# Patient Record
Sex: FEMALE | Race: WHITE | Employment: UNEMPLOYED | URBAN - METROPOLITAN AREA
[De-identification: names, ages, dates, MRNs, and addresses within clinical notes are randomized per-mention and may not be internally consistent; named-entity substitution may affect disease eponyms.]

---

## 2019-01-19 ENCOUNTER — APPOINTMENT (OUTPATIENT)
Dept: GENERAL RADIOLOGY | Age: 59
End: 2019-01-19
Payer: COMMERCIAL

## 2019-01-19 ENCOUNTER — HOSPITAL ENCOUNTER (EMERGENCY)
Age: 59
Discharge: HOME | End: 2019-01-19
Attending: EMERGENCY MEDICINE
Payer: COMMERCIAL

## 2019-01-19 VITALS
OXYGEN SATURATION: 100 % | HEART RATE: 94 BPM | SYSTOLIC BLOOD PRESSURE: 150 MMHG | BODY MASS INDEX: 15.7 KG/M2 | HEIGHT: 67 IN | RESPIRATION RATE: 21 BRPM | WEIGHT: 100 LBS | TEMPERATURE: 98 F | DIASTOLIC BLOOD PRESSURE: 98 MMHG

## 2019-01-19 DIAGNOSIS — J40 BRONCHITIS: ICD-10-CM

## 2019-01-19 DIAGNOSIS — J44.1 COPD EXACERBATION (HCC): Primary | ICD-10-CM

## 2019-01-19 LAB
A/G RATIO: 1.3 (ref 1.1–2.2)
ALBUMIN SERPL-MCNC: 4.2 G/DL (ref 3.4–5)
ALP BLD-CCNC: 69 U/L (ref 40–129)
ALT SERPL-CCNC: 11 U/L (ref 10–40)
ANION GAP SERPL CALCULATED.3IONS-SCNC: 11 MMOL/L (ref 3–16)
AST SERPL-CCNC: 19 U/L (ref 15–37)
BASOPHILS ABSOLUTE: 0.1 K/UL (ref 0–0.2)
BASOPHILS RELATIVE PERCENT: 0.8 %
BILIRUB SERPL-MCNC: 0.4 MG/DL (ref 0–1)
BUN BLDV-MCNC: 15 MG/DL (ref 7–20)
CALCIUM SERPL-MCNC: 9.6 MG/DL (ref 8.3–10.6)
CHLORIDE BLD-SCNC: 104 MMOL/L (ref 99–110)
CO2: 27 MMOL/L (ref 21–32)
CREAT SERPL-MCNC: <0.5 MG/DL (ref 0.6–1.1)
EOSINOPHILS ABSOLUTE: 0.1 K/UL (ref 0–0.6)
EOSINOPHILS RELATIVE PERCENT: 0.9 %
GFR AFRICAN AMERICAN: >60
GFR NON-AFRICAN AMERICAN: >60
GLOBULIN: 3.2 G/DL
GLUCOSE BLD-MCNC: 98 MG/DL (ref 70–99)
HCT VFR BLD CALC: 45.2 % (ref 36–48)
HEMOGLOBIN: 15.3 G/DL (ref 12–16)
LYMPHOCYTES ABSOLUTE: 1.6 K/UL (ref 1–5.1)
LYMPHOCYTES RELATIVE PERCENT: 17.4 %
MCH RBC QN AUTO: 33 PG (ref 26–34)
MCHC RBC AUTO-ENTMCNC: 33.9 G/DL (ref 31–36)
MCV RBC AUTO: 97.4 FL (ref 80–100)
MONOCYTES ABSOLUTE: 0.6 K/UL (ref 0–1.3)
MONOCYTES RELATIVE PERCENT: 6.7 %
NEUTROPHILS ABSOLUTE: 6.8 K/UL (ref 1.7–7.7)
NEUTROPHILS RELATIVE PERCENT: 74.2 %
PDW BLD-RTO: 13.3 % (ref 12.4–15.4)
PLATELET # BLD: 274 K/UL (ref 135–450)
PMV BLD AUTO: 7.4 FL (ref 5–10.5)
POTASSIUM SERPL-SCNC: 4.1 MMOL/L (ref 3.5–5.1)
RBC # BLD: 4.64 M/UL (ref 4–5.2)
SODIUM BLD-SCNC: 142 MMOL/L (ref 136–145)
TOTAL PROTEIN: 7.4 G/DL (ref 6.4–8.2)
WBC # BLD: 9.2 K/UL (ref 4–11)

## 2019-01-19 PROCEDURE — 71046 X-RAY EXAM CHEST 2 VIEWS: CPT

## 2019-01-19 PROCEDURE — 99285 EMERGENCY DEPT VISIT HI MDM: CPT

## 2019-01-19 PROCEDURE — 80053 COMPREHEN METABOLIC PANEL: CPT

## 2019-01-19 PROCEDURE — 85025 COMPLETE CBC W/AUTO DIFF WBC: CPT

## 2019-01-19 PROCEDURE — 93005 ELECTROCARDIOGRAM TRACING: CPT | Performed by: EMERGENCY MEDICINE

## 2019-01-19 PROCEDURE — 6360000002 HC RX W HCPCS: Performed by: EMERGENCY MEDICINE

## 2019-01-19 PROCEDURE — 36415 COLL VENOUS BLD VENIPUNCTURE: CPT

## 2019-01-19 PROCEDURE — 6370000000 HC RX 637 (ALT 250 FOR IP): Performed by: EMERGENCY MEDICINE

## 2019-01-19 RX ORDER — NICOTINE 21 MG/24HR
1 PATCH, TRANSDERMAL 24 HOURS TRANSDERMAL EVERY 24 HOURS
COMMUNITY
End: 2019-01-19

## 2019-01-19 RX ORDER — BISMUTH SUBSALICYLATE 262 MG/1
524 TABLET, CHEWABLE ORAL
Qty: 60 TABLET | Refills: 0 | Status: SHIPPED | OUTPATIENT
Start: 2019-01-19 | End: 2020-07-08

## 2019-01-19 RX ORDER — IPRATROPIUM BROMIDE AND ALBUTEROL SULFATE 2.5; .5 MG/3ML; MG/3ML
1 SOLUTION RESPIRATORY (INHALATION) ONCE
Status: COMPLETED | OUTPATIENT
Start: 2019-01-19 | End: 2019-01-19

## 2019-01-19 RX ORDER — BISMUTH SUBSALICYLATE 262 MG/1
524 TABLET, CHEWABLE ORAL
COMMUNITY
End: 2019-01-19

## 2019-01-19 RX ORDER — PAROXETINE HYDROCHLORIDE 20 MG/1
20 TABLET, FILM COATED ORAL EVERY MORNING
COMMUNITY
End: 2019-01-19

## 2019-01-19 RX ORDER — CHOLESTYRAMINE 4 G/9G
1 POWDER, FOR SUSPENSION ORAL 2 TIMES DAILY
Qty: 90 PACKET | Refills: 0 | Status: SHIPPED | OUTPATIENT
Start: 2019-01-19 | End: 2019-05-07 | Stop reason: CLARIF

## 2019-01-19 RX ORDER — PREDNISONE 1 MG/1
40 TABLET ORAL DAILY
Qty: 200 TABLET | Refills: 0 | Status: SHIPPED | OUTPATIENT
Start: 2019-01-19 | End: 2019-01-24

## 2019-01-19 RX ORDER — NICOTINE 21 MG/24HR
1 PATCH, TRANSDERMAL 24 HOURS TRANSDERMAL EVERY 24 HOURS
Qty: 30 PATCH | Refills: 0 | Status: SHIPPED | OUTPATIENT
Start: 2019-01-19 | End: 2019-10-17 | Stop reason: ALTCHOICE

## 2019-01-19 RX ORDER — ALBUTEROL SULFATE 2.5 MG/3ML
5 SOLUTION RESPIRATORY (INHALATION) ONCE
Status: COMPLETED | OUTPATIENT
Start: 2019-01-19 | End: 2019-01-19

## 2019-01-19 RX ORDER — POTASSIUM CHLORIDE 1.5 G/1.77G
20 POWDER, FOR SOLUTION ORAL 2 TIMES DAILY
Qty: 30 EACH | Refills: 3 | Status: SHIPPED | OUTPATIENT
Start: 2019-01-19 | End: 2019-10-17 | Stop reason: ALTCHOICE

## 2019-01-19 RX ORDER — LEVOTHYROXINE SODIUM 88 UG/1
88 TABLET ORAL DAILY
COMMUNITY
End: 2019-01-19

## 2019-01-19 RX ORDER — PAROXETINE HYDROCHLORIDE 20 MG/1
20 TABLET, FILM COATED ORAL EVERY MORNING
Qty: 30 TABLET | Refills: 0 | Status: SHIPPED | OUTPATIENT
Start: 2019-01-19 | End: 2019-10-17 | Stop reason: ALTCHOICE

## 2019-01-19 RX ORDER — LEVOTHYROXINE SODIUM 88 UG/1
88 TABLET ORAL DAILY
Qty: 30 TABLET | Refills: 0 | Status: SHIPPED | OUTPATIENT
Start: 2019-01-19 | End: 2020-11-10

## 2019-01-19 RX ORDER — GABAPENTIN 100 MG/1
100 CAPSULE ORAL 3 TIMES DAILY
COMMUNITY
End: 2019-01-19

## 2019-01-19 RX ORDER — ALBUTEROL SULFATE 90 UG/1
2 AEROSOL, METERED RESPIRATORY (INHALATION) ONCE
Status: COMPLETED | OUTPATIENT
Start: 2019-01-19 | End: 2019-01-19

## 2019-01-19 RX ORDER — CHOLESTYRAMINE 4 G/9G
1 POWDER, FOR SUSPENSION ORAL 2 TIMES DAILY
COMMUNITY
End: 2019-01-19

## 2019-01-19 RX ORDER — PREDNISONE 20 MG/1
40 TABLET ORAL ONCE
Status: COMPLETED | OUTPATIENT
Start: 2019-01-19 | End: 2019-01-19

## 2019-01-19 RX ORDER — GABAPENTIN 100 MG/1
100 CAPSULE ORAL 3 TIMES DAILY
Qty: 90 CAPSULE | Refills: 0 | Status: SHIPPED | OUTPATIENT
Start: 2019-01-19 | End: 2019-10-17 | Stop reason: ALTCHOICE

## 2019-01-19 RX ADMIN — ALBUTEROL SULFATE 5 MG: 2.5 SOLUTION RESPIRATORY (INHALATION) at 19:52

## 2019-01-19 RX ADMIN — PREDNISONE 40 MG: 20 TABLET ORAL at 19:52

## 2019-01-19 RX ADMIN — IPRATROPIUM BROMIDE AND ALBUTEROL SULFATE 1 AMPULE: .5; 3 SOLUTION RESPIRATORY (INHALATION) at 19:52

## 2019-01-19 RX ADMIN — ALBUTEROL SULFATE 2 PUFF: 90 AEROSOL, METERED RESPIRATORY (INHALATION) at 21:19

## 2019-01-20 LAB
EKG ATRIAL RATE: 93 BPM
EKG DIAGNOSIS: NORMAL
EKG P AXIS: 82 DEGREES
EKG P-R INTERVAL: 160 MS
EKG Q-T INTERVAL: 370 MS
EKG QRS DURATION: 72 MS
EKG QTC CALCULATION (BAZETT): 460 MS
EKG R AXIS: 85 DEGREES
EKG T AXIS: 78 DEGREES
EKG VENTRICULAR RATE: 93 BPM

## 2019-01-20 PROCEDURE — 93010 ELECTROCARDIOGRAM REPORT: CPT | Performed by: INTERNAL MEDICINE

## 2019-01-29 ENCOUNTER — APPOINTMENT (OUTPATIENT)
Dept: GENERAL RADIOLOGY | Age: 59
End: 2019-01-29
Payer: COMMERCIAL

## 2019-01-29 ENCOUNTER — APPOINTMENT (OUTPATIENT)
Dept: CT IMAGING | Age: 59
End: 2019-01-29
Payer: COMMERCIAL

## 2019-01-29 ENCOUNTER — HOSPITAL ENCOUNTER (EMERGENCY)
Age: 59
Discharge: HOME | End: 2019-01-29
Attending: EMERGENCY MEDICINE
Payer: COMMERCIAL

## 2019-01-29 VITALS
TEMPERATURE: 97.5 F | BODY MASS INDEX: 15.7 KG/M2 | WEIGHT: 100 LBS | RESPIRATION RATE: 16 BRPM | OXYGEN SATURATION: 98 % | HEIGHT: 67 IN | SYSTOLIC BLOOD PRESSURE: 102 MMHG | HEART RATE: 102 BPM | DIASTOLIC BLOOD PRESSURE: 75 MMHG

## 2019-01-29 DIAGNOSIS — I31.39 PERICARDIAL EFFUSION: ICD-10-CM

## 2019-01-29 DIAGNOSIS — J18.9 PNEUMONIA, UNSPECIFIED ORGANISM: Primary | ICD-10-CM

## 2019-01-29 DIAGNOSIS — R07.9 LEFT SIDED CHEST PAIN: ICD-10-CM

## 2019-01-29 DIAGNOSIS — J44.1 COPD EXACERBATION (HCC): ICD-10-CM

## 2019-01-29 DIAGNOSIS — R91.1 PULMONARY NODULE: ICD-10-CM

## 2019-01-29 LAB
A/G RATIO: 1.1 (ref 1.1–2.2)
ALBUMIN SERPL-MCNC: 3.4 G/DL (ref 3.4–5)
ALP BLD-CCNC: 59 U/L (ref 40–129)
ALT SERPL-CCNC: 11 U/L (ref 10–40)
ANION GAP SERPL CALCULATED.3IONS-SCNC: 10 MMOL/L (ref 3–16)
AST SERPL-CCNC: 13 U/L (ref 15–37)
BASOPHILS ABSOLUTE: 0.1 K/UL (ref 0–0.2)
BASOPHILS RELATIVE PERCENT: 1.1 %
BILIRUB SERPL-MCNC: 0.3 MG/DL (ref 0–1)
BUN BLDV-MCNC: 21 MG/DL (ref 7–20)
CALCIUM SERPL-MCNC: 9.2 MG/DL (ref 8.3–10.6)
CHLORIDE BLD-SCNC: 100 MMOL/L (ref 99–110)
CO2: 30 MMOL/L (ref 21–32)
CREAT SERPL-MCNC: 0.7 MG/DL (ref 0.6–1.1)
D DIMER: 320 NG/ML DDU (ref 0–229)
EKG ATRIAL RATE: 92 BPM
EKG DIAGNOSIS: NORMAL
EKG P AXIS: 78 DEGREES
EKG P-R INTERVAL: 130 MS
EKG Q-T INTERVAL: 360 MS
EKG QRS DURATION: 70 MS
EKG QTC CALCULATION (BAZETT): 445 MS
EKG R AXIS: 82 DEGREES
EKG T AXIS: 77 DEGREES
EKG VENTRICULAR RATE: 92 BPM
EOSINOPHILS ABSOLUTE: 0.1 K/UL (ref 0–0.6)
EOSINOPHILS RELATIVE PERCENT: 1.3 %
GFR AFRICAN AMERICAN: >60
GFR NON-AFRICAN AMERICAN: >60
GLOBULIN: 3.2 G/DL
GLUCOSE BLD-MCNC: 101 MG/DL (ref 70–99)
HCT VFR BLD CALC: 40.2 % (ref 36–48)
HEMOGLOBIN: 13.6 G/DL (ref 12–16)
LACTIC ACID, SEPSIS: 1.8 MMOL/L (ref 0.4–1.9)
LACTIC ACID: 2.1 MMOL/L (ref 0.4–2)
LYMPHOCYTES ABSOLUTE: 2.1 K/UL (ref 1–5.1)
LYMPHOCYTES RELATIVE PERCENT: 20.5 %
MAGNESIUM: 2.1 MG/DL (ref 1.8–2.4)
MCH RBC QN AUTO: 32.9 PG (ref 26–34)
MCHC RBC AUTO-ENTMCNC: 33.8 G/DL (ref 31–36)
MCV RBC AUTO: 97.4 FL (ref 80–100)
MONOCYTES ABSOLUTE: 0.8 K/UL (ref 0–1.3)
MONOCYTES RELATIVE PERCENT: 7.8 %
NEUTROPHILS ABSOLUTE: 7.2 K/UL (ref 1.7–7.7)
NEUTROPHILS RELATIVE PERCENT: 69.3 %
PDW BLD-RTO: 13.2 % (ref 12.4–15.4)
PLATELET # BLD: 340 K/UL (ref 135–450)
PMV BLD AUTO: 6.9 FL (ref 5–10.5)
POTASSIUM REFLEX MAGNESIUM: 3.3 MMOL/L (ref 3.5–5.1)
RAPID INFLUENZA  B AGN: NEGATIVE
RAPID INFLUENZA A AGN: NEGATIVE
RBC # BLD: 4.12 M/UL (ref 4–5.2)
SODIUM BLD-SCNC: 140 MMOL/L (ref 136–145)
TOTAL PROTEIN: 6.6 G/DL (ref 6.4–8.2)
TROPONIN: <0.01 NG/ML
WBC # BLD: 10.4 K/UL (ref 4–11)

## 2019-01-29 PROCEDURE — 2500000003 HC RX 250 WO HCPCS: Performed by: PHYSICIAN ASSISTANT

## 2019-01-29 PROCEDURE — 93005 ELECTROCARDIOGRAM TRACING: CPT | Performed by: PHYSICIAN ASSISTANT

## 2019-01-29 PROCEDURE — 93010 ELECTROCARDIOGRAM REPORT: CPT | Performed by: INTERNAL MEDICINE

## 2019-01-29 PROCEDURE — 87040 BLOOD CULTURE FOR BACTERIA: CPT

## 2019-01-29 PROCEDURE — 6360000002 HC RX W HCPCS: Performed by: PHYSICIAN ASSISTANT

## 2019-01-29 PROCEDURE — 84484 ASSAY OF TROPONIN QUANT: CPT

## 2019-01-29 PROCEDURE — 87804 INFLUENZA ASSAY W/OPTIC: CPT

## 2019-01-29 PROCEDURE — 99285 EMERGENCY DEPT VISIT HI MDM: CPT

## 2019-01-29 PROCEDURE — 71260 CT THORAX DX C+: CPT

## 2019-01-29 PROCEDURE — 36415 COLL VENOUS BLD VENIPUNCTURE: CPT

## 2019-01-29 PROCEDURE — 96375 TX/PRO/DX INJ NEW DRUG ADDON: CPT

## 2019-01-29 PROCEDURE — 83605 ASSAY OF LACTIC ACID: CPT

## 2019-01-29 PROCEDURE — 80053 COMPREHEN METABOLIC PANEL: CPT

## 2019-01-29 PROCEDURE — 96361 HYDRATE IV INFUSION ADD-ON: CPT

## 2019-01-29 PROCEDURE — 6370000000 HC RX 637 (ALT 250 FOR IP): Performed by: EMERGENCY MEDICINE

## 2019-01-29 PROCEDURE — 85025 COMPLETE CBC W/AUTO DIFF WBC: CPT

## 2019-01-29 PROCEDURE — 96365 THER/PROPH/DIAG IV INF INIT: CPT

## 2019-01-29 PROCEDURE — 6360000002 HC RX W HCPCS: Performed by: EMERGENCY MEDICINE

## 2019-01-29 PROCEDURE — 83735 ASSAY OF MAGNESIUM: CPT

## 2019-01-29 PROCEDURE — 6370000000 HC RX 637 (ALT 250 FOR IP): Performed by: PHYSICIAN ASSISTANT

## 2019-01-29 PROCEDURE — 6360000004 HC RX CONTRAST MEDICATION: Performed by: PHYSICIAN ASSISTANT

## 2019-01-29 PROCEDURE — 85379 FIBRIN DEGRADATION QUANT: CPT

## 2019-01-29 PROCEDURE — 2580000003 HC RX 258: Performed by: PHYSICIAN ASSISTANT

## 2019-01-29 PROCEDURE — 71045 X-RAY EXAM CHEST 1 VIEW: CPT

## 2019-01-29 RX ORDER — METHYLPREDNISOLONE SODIUM SUCCINATE 125 MG/2ML
125 INJECTION, POWDER, LYOPHILIZED, FOR SOLUTION INTRAMUSCULAR; INTRAVENOUS ONCE
Status: COMPLETED | OUTPATIENT
Start: 2019-01-29 | End: 2019-01-29

## 2019-01-29 RX ORDER — ALBUTEROL SULFATE 2.5 MG/3ML
2.5 SOLUTION RESPIRATORY (INHALATION) EVERY 6 HOURS PRN
Qty: 60 VIAL | Refills: 0 | Status: SHIPPED | OUTPATIENT
Start: 2019-01-29 | End: 2022-07-21 | Stop reason: SDUPTHER

## 2019-01-29 RX ORDER — KETOROLAC TROMETHAMINE 30 MG/ML
15 INJECTION, SOLUTION INTRAMUSCULAR; INTRAVENOUS ONCE
Status: COMPLETED | OUTPATIENT
Start: 2019-01-29 | End: 2019-01-29

## 2019-01-29 RX ORDER — ALBUTEROL SULFATE 2.5 MG/3ML
2.5 SOLUTION RESPIRATORY (INHALATION) ONCE
Status: COMPLETED | OUTPATIENT
Start: 2019-01-29 | End: 2019-01-29

## 2019-01-29 RX ORDER — DIPHENHYDRAMINE HYDROCHLORIDE 50 MG/ML
50 INJECTION INTRAMUSCULAR; INTRAVENOUS ONCE
Status: COMPLETED | OUTPATIENT
Start: 2019-01-29 | End: 2019-01-29

## 2019-01-29 RX ORDER — LEVOFLOXACIN 750 MG/1
750 TABLET ORAL DAILY
Qty: 5 TABLET | Refills: 0 | Status: SHIPPED | OUTPATIENT
Start: 2019-01-29 | End: 2019-02-03

## 2019-01-29 RX ORDER — 0.9 % SODIUM CHLORIDE 0.9 %
1000 INTRAVENOUS SOLUTION INTRAVENOUS ONCE
Status: COMPLETED | OUTPATIENT
Start: 2019-01-29 | End: 2019-01-29

## 2019-01-29 RX ORDER — LEVOFLOXACIN 5 MG/ML
500 INJECTION, SOLUTION INTRAVENOUS ONCE
Status: COMPLETED | OUTPATIENT
Start: 2019-01-29 | End: 2019-01-29

## 2019-01-29 RX ORDER — IPRATROPIUM BROMIDE AND ALBUTEROL SULFATE 2.5; .5 MG/3ML; MG/3ML
1 SOLUTION RESPIRATORY (INHALATION) ONCE
Status: COMPLETED | OUTPATIENT
Start: 2019-01-29 | End: 2019-01-29

## 2019-01-29 RX ORDER — PREDNISONE 10 MG/1
TABLET ORAL
Qty: 30 TABLET | Refills: 0 | Status: SHIPPED | OUTPATIENT
Start: 2019-01-29 | End: 2019-02-08

## 2019-01-29 RX ORDER — LIDOCAINE 4 G/G
1 PATCH TOPICAL DAILY
Status: DISCONTINUED | OUTPATIENT
Start: 2019-01-29 | End: 2019-01-29 | Stop reason: HOSPADM

## 2019-01-29 RX ORDER — LIDOCAINE 50 MG/G
1 PATCH TOPICAL EVERY 24 HOURS
Qty: 10 PATCH | Refills: 0 | Status: SHIPPED | OUTPATIENT
Start: 2019-01-29 | End: 2019-02-08

## 2019-01-29 RX ORDER — 0.9 % SODIUM CHLORIDE 0.9 %
400 INTRAVENOUS SOLUTION INTRAVENOUS ONCE
Status: COMPLETED | OUTPATIENT
Start: 2019-01-29 | End: 2019-01-29

## 2019-01-29 RX ORDER — POTASSIUM CHLORIDE 20MEQ/15ML
20 LIQUID (ML) ORAL ONCE
Status: COMPLETED | OUTPATIENT
Start: 2019-01-29 | End: 2019-01-29

## 2019-01-29 RX ADMIN — IPRATROPIUM BROMIDE AND ALBUTEROL SULFATE 1 AMPULE: .5; 3 SOLUTION RESPIRATORY (INHALATION) at 13:24

## 2019-01-29 RX ADMIN — DIPHENHYDRAMINE HYDROCHLORIDE 50 MG: 50 INJECTION, SOLUTION INTRAMUSCULAR; INTRAVENOUS at 15:20

## 2019-01-29 RX ADMIN — ALBUTEROL SULFATE 2.5 MG: 2.5 SOLUTION RESPIRATORY (INHALATION) at 16:36

## 2019-01-29 RX ADMIN — SODIUM CHLORIDE 400 ML: 9 INJECTION, SOLUTION INTRAVENOUS at 15:20

## 2019-01-29 RX ADMIN — SODIUM CHLORIDE 1000 ML: 9 INJECTION, SOLUTION INTRAVENOUS at 13:24

## 2019-01-29 RX ADMIN — IOPAMIDOL 75 ML: 755 INJECTION, SOLUTION INTRAVENOUS at 15:49

## 2019-01-29 RX ADMIN — LEVOFLOXACIN 500 MG: 5 INJECTION, SOLUTION INTRAVENOUS at 14:34

## 2019-01-29 RX ADMIN — KETOROLAC TROMETHAMINE 15 MG: 30 INJECTION, SOLUTION INTRAMUSCULAR; INTRAVENOUS at 14:35

## 2019-01-29 RX ADMIN — POTASSIUM CHLORIDE 20 MEQ: 20 SOLUTION ORAL at 15:20

## 2019-01-29 RX ADMIN — IPRATROPIUM BROMIDE AND ALBUTEROL SULFATE 1 AMPULE: .5; 3 SOLUTION RESPIRATORY (INHALATION) at 16:05

## 2019-01-29 RX ADMIN — FAMOTIDINE 20 MG: 10 INJECTION, SOLUTION INTRAVENOUS at 15:20

## 2019-01-29 RX ADMIN — METHYLPREDNISOLONE SODIUM SUCCINATE 125 MG: 125 INJECTION, POWDER, FOR SOLUTION INTRAMUSCULAR; INTRAVENOUS at 13:23

## 2019-01-29 ASSESSMENT — PAIN DESCRIPTION - ORIENTATION: ORIENTATION: LEFT

## 2019-01-29 ASSESSMENT — ENCOUNTER SYMPTOMS
COUGH: 1
SHORTNESS OF BREATH: 1
ABDOMINAL PAIN: 0
VOMITING: 0
SPUTUM PRODUCTION: 1

## 2019-01-29 ASSESSMENT — PAIN DESCRIPTION - DESCRIPTORS
DESCRIPTORS: HEADACHE;SHARP
DESCRIPTORS: ACHING

## 2019-01-29 ASSESSMENT — PAIN DESCRIPTION - PAIN TYPE: TYPE: ACUTE PAIN

## 2019-01-29 ASSESSMENT — PAIN DESCRIPTION - LOCATION
LOCATION: RIB CAGE
LOCATION: CHEST;HEAD

## 2019-01-29 ASSESSMENT — PAIN DESCRIPTION - FREQUENCY: FREQUENCY: CONTINUOUS

## 2019-01-29 ASSESSMENT — PAIN SCALES - GENERAL
PAINLEVEL_OUTOF10: 7
PAINLEVEL_OUTOF10: 10

## 2019-01-29 ASSESSMENT — HEART SCORE: ECG: 0

## 2019-02-04 LAB
BLOOD CULTURE, ROUTINE: NORMAL
CULTURE, BLOOD 2: NORMAL

## 2019-04-23 ENCOUNTER — TELEPHONE (OUTPATIENT)
Dept: PULMONOLOGY | Age: 59
End: 2019-04-23

## 2019-04-23 DIAGNOSIS — R91.1 PULMONARY NODULE: Primary | ICD-10-CM

## 2019-05-03 ENCOUNTER — HOSPITAL ENCOUNTER (OUTPATIENT)
Dept: CT IMAGING | Age: 59
Discharge: HOME | End: 2019-05-03
Payer: COMMERCIAL

## 2019-05-03 DIAGNOSIS — R91.1 PULMONARY NODULE: ICD-10-CM

## 2019-05-03 PROCEDURE — 71250 CT THORAX DX C-: CPT

## 2019-05-07 ENCOUNTER — OFFICE VISIT (OUTPATIENT)
Dept: PULMONOLOGY | Age: 59
End: 2019-05-07
Payer: COMMERCIAL

## 2019-05-07 VITALS
HEART RATE: 72 BPM | BODY MASS INDEX: 15.54 KG/M2 | RESPIRATION RATE: 16 BRPM | WEIGHT: 99 LBS | OXYGEN SATURATION: 99 % | HEIGHT: 67 IN | DIASTOLIC BLOOD PRESSURE: 64 MMHG | TEMPERATURE: 98.7 F | SYSTOLIC BLOOD PRESSURE: 90 MMHG

## 2019-05-07 DIAGNOSIS — R91.1 PULMONARY NODULE: ICD-10-CM

## 2019-05-07 DIAGNOSIS — Z72.0 TOBACCO ABUSE: ICD-10-CM

## 2019-05-07 DIAGNOSIS — J44.9 CHRONIC OBSTRUCTIVE PULMONARY DISEASE, UNSPECIFIED COPD TYPE (HCC): Primary | ICD-10-CM

## 2019-05-07 PROCEDURE — 99245 OFF/OP CONSLTJ NEW/EST HI 55: CPT | Performed by: INTERNAL MEDICINE

## 2019-05-07 PROCEDURE — 3023F SPIROM DOC REV: CPT | Performed by: INTERNAL MEDICINE

## 2019-05-07 PROCEDURE — G8926 SPIRO NO PERF OR DOC: HCPCS | Performed by: INTERNAL MEDICINE

## 2019-05-07 PROCEDURE — G8419 CALC BMI OUT NRM PARAM NOF/U: HCPCS | Performed by: INTERNAL MEDICINE

## 2019-05-07 PROCEDURE — G8427 DOCREV CUR MEDS BY ELIG CLIN: HCPCS | Performed by: INTERNAL MEDICINE

## 2019-05-07 RX ORDER — POLYETHYLENE GLYCOL 3350 17 G
2 POWDER IN PACKET (EA) ORAL PRN
COMMUNITY
End: 2019-10-17 | Stop reason: ALTCHOICE

## 2019-05-07 RX ORDER — DICYCLOMINE HYDROCHLORIDE 10 MG/1
10 CAPSULE ORAL 3 TIMES DAILY
COMMUNITY
End: 2019-10-17 | Stop reason: ALTCHOICE

## 2019-05-07 RX ORDER — ALBUTEROL SULFATE 90 UG/1
2 AEROSOL, METERED RESPIRATORY (INHALATION) EVERY 6 HOURS PRN
COMMUNITY
End: 2020-07-08 | Stop reason: SDUPTHER

## 2019-05-07 RX ORDER — TRAMADOL HYDROCHLORIDE 50 MG/1
50 TABLET ORAL EVERY 8 HOURS PRN
COMMUNITY
End: 2019-10-17 | Stop reason: ALTCHOICE

## 2019-05-07 RX ORDER — MELOXICAM 15 MG/1
7.5 TABLET ORAL 2 TIMES DAILY
COMMUNITY
End: 2020-11-10

## 2019-05-07 RX ORDER — CELECOXIB 200 MG/1
200 CAPSULE ORAL 2 TIMES DAILY
COMMUNITY
End: 2019-10-17 | Stop reason: ALTCHOICE

## 2019-05-07 NOTE — PROGRESS NOTES
Chief Complaint: lung nodule    Consulting provider: JASON Casper -*    HPI: 62 y.o. female patient is being seen at the request of JASON Casper for a consultation regarding an abnormal Chest CT. She presented to the emergency room in January and was treated for pneumonia which was found on her CAT scan. The pneumonia had some nodular components and the patient was referred for follow-up. The patient has a history of smoking and COPD. The patient does not have SOB at rest but has ACKERMAN. Exercise tolerance on level ground is not limited. The patient has a daily intermittent cough that is usually dry or sometimes productive of yellow sputum. The patient does wheeze intermittently. No fever. The patient has smoked 1- 1.5 PPD for 40 years. Environmental/chemical exposure: Asbestos exposure: no  Patient  isn on disability  TB exposure: no    The information above included the review and summarization of old records. The history was obtained from these old records and from the patient directly. Outside information from Vish Molina NP regarding an abnormal Chest CT was  was reviewed. Moved here for [place] where she had PFts in the past    REVIEW OF SYSTEMS:    See HPI and scanned Review of Systems sheet. Past Medical History:   Diagnosis Date    COPD (chronic obstructive pulmonary disease) ([facility] [address]     Hypertension      Past Surgical History      Procedure Laterality Date    BREAST LUMPECTOMY  2015     SECTION      THYROIDECTOMY       Social History:    TOBACCO:   reports that she has been smoking cigarettes. She has been smoking about 0.50 packs per day. She has never used smokeless tobacco.  ETOH:   reports that she does not drink alcohol. Family History  History reviewed. No pertinent family history. Allergies:  is allergic to contrast [iodides] and vicodin [hydrocodone-acetaminophen].       Current Outpatient Medications:     celecoxib (CELEBREX) 200 MG capsule, Take 200 mg by mouth 2 times daily, Disp: , Rfl:     traMADol (ULTRAM) 50 MG tablet, Take 50 mg by mouth every 8 hours as needed for Pain., Disp: , Rfl:     dicyclomine (BENTYL) 10 MG capsule, Take 10 mg by mouth 3 times daily, Disp: , Rfl:     meloxicam (MOBIC) 15 MG tablet, Take 15 mg by mouth daily, Disp: , Rfl:     albuterol sulfate  (90 Base) MCG/ACT inhaler, Inhale 2 puffs into the lungs every 6 hours as needed for Wheezing, Disp: , Rfl:     nicotine polacrilex (COMMIT) 2 MG lozenge, Take 2 mg by mouth as needed for Smoking cessation, Disp: , Rfl:     umeclidinium-vilanterol (ANORO ELLIPTA) 62.5-25 MCG/INH AEPB inhaler, Inhale 1 puff into the lungs daily, Disp: 1 each, Rfl: 11    Cholecalciferol (VITAMIN D3) 5000 units CAPS, , Disp: , Rfl:     albuterol (PROVENTIL) (2.5 MG/3ML) 0.083% nebulizer solution, Take 3 mLs by nebulization every 6 hours as needed for Wheezing, Disp: 60 vial, Rfl: 0    alendronate-cholecalciferol (FOSAMAX PLUS D)  MG-UNIT per tablet, Take 1 tablet by mouth every 7 days, Disp: 4 tablet, Rfl: 0    bismuth subsalicylate (PEPTO BISMOL) 262 MG chewable tablet, Take 2 tablets by mouth 4 times daily (before meals and nightly), Disp: 60 tablet, Rfl: 0    gabapentin (NEURONTIN) 100 MG capsule, Take 1 capsule by mouth 3 times daily for 30 days. ., Disp: 90 capsule, Rfl: 0    levothyroxine (SYNTHROID) 88 MCG tablet, Take 1 tablet by mouth Daily, Disp: 30 tablet, Rfl: 0    nicotine (NICODERM CQ) 14 MG/24HR, Place 1 patch onto the skin every 24 hours, Disp: 30 patch, Rfl: 0    PARoxetine (PAXIL) 20 MG tablet, Take 1 tablet by mouth every morning (Patient taking differently: Take 40 mg by mouth every morning ), Disp: 30 tablet, Rfl: 0    potassium chloride (KLOR-CON) 20 MEQ packet, Take 20 mEq by mouth 2 times daily, Disp: 30 each, Rfl: 3    Umeclidinium Bromide 62.5 MCG/INH AEPB, Inhale 1 puff into the lungs daily, Disp: 30 each, Rfl: 0    Objective:   PHYSICAL EXAM:   BP 90/64 (Site: Left Upper Arm, Position: Sitting, Cuff Size: Medium Adult)   Pulse 72   Temp 98.7 °F (37.1 °C) (Oral)   Resp 16   Ht 5' 7\" (1.702 m)   Wt 99 lb (44.9 kg)   SpO2 99% Comment: RA  BMI 15.51 kg/m²    Constitutional:  No acute distress. Eyes: PERRL. Conjunctivae anicteric. ENT: Normal nose. Normal tongue. Oropharynx is clear. Neck:  Trachea is midline. No thyroid tenderness. Respiratory: No accessory muscle usage. Decreased breath sounds. No wheezes. No rales. No Rhonchi. Cardiovascular: Normal S1S2. No digit clubbing. No digit cyanosis. No LE edema. Lymphatic: No cervical or supraclavicular adenopathy. Skin: No rash on the exposed extremities. No Nodules or induration on exposed extremities. Psychiatric: No anxiety or Agitation. Alert and Oriented to person, place and time. LABS:  The recent relevant labs were reviewed    PFTs Date  FVC  (%) FEV1  (%) FEV1/FVC ratio    TLC  (%)  RV  (%)   DLCO (%) Bronchodilator response:    IMAGING:  I personally reviewed and interpreted the following imaging today in the office:   5/3/19 Chest CT:  1. Near complete resolution of airspace changes in the lingula. 2. Resolution of airspace changes in the right middle and lower lobes as well   with no residual nodules.  Calcified granulomata are stable. ASSESSMENT:  · Pneumonia is resolved  · COPD  · Tobacco abuse    PLAN:   · She is trying to quit smoking using a nicotine patch  · PFTs next visit  · Obtain past PFTs from [facility]  · She is on anoro and PRN albuterol  · Rx for Anoro  Screening CT scan was considered in a lung cancer screening counseling and shared decision making visit today that included the following elements:   Eligibility: Age: 62. There are no signs or symptoms of lung cancer.   Tobacco History 40-60 pack-years  Verbal counseling has been performed by me to include benefits and harms of screening, follow-up diagnostic testing, over-diagnosis, false positive rate, and total radiation exposure;   I have counseled on the importance of adherence to annual lung cancer LDCT screening, the impact of comorbidities and patient is willing to undergo diagnosis and treatment;   I have provided counseling on the importance of maintaining cigarette smoking abstinence if former smoker; or the importance of smoking cessation if current smoker and, if appropriate, furnishing of information about tobacco cessation interventions; and   I have furnished a written order for lung cancer screening with LDCT. Order for Screening chest CT scan should be placed with documentation as below:  Beneficiary date of birth;    Actual pack - year smoking history (number) from above;   Current smoking status, and for former smokers, the number of years since quitting smoking from above  Beneficiary is asymptomatic   National Provider Identifier (NPI) for Dr. Lupillo Ariza

## 2019-07-10 ENCOUNTER — HOSPITAL ENCOUNTER (OUTPATIENT)
Age: 59
Discharge: HOME | End: 2019-07-10
Payer: COMMERCIAL

## 2019-07-10 ENCOUNTER — HOSPITAL ENCOUNTER (OUTPATIENT)
Dept: GENERAL RADIOLOGY | Age: 59
Discharge: HOME | End: 2019-07-10
Payer: COMMERCIAL

## 2019-07-10 DIAGNOSIS — M47.812 SPONDYLOSIS OF CERVICAL REGION WITHOUT MYELOPATHY OR RADICULOPATHY: ICD-10-CM

## 2019-07-10 PROCEDURE — 72040 X-RAY EXAM NECK SPINE 2-3 VW: CPT

## 2019-09-23 ENCOUNTER — HOSPITAL ENCOUNTER (OUTPATIENT)
Dept: ULTRASOUND IMAGING | Age: 59
Discharge: HOME | End: 2019-09-23
Payer: MEDICAID

## 2019-09-23 DIAGNOSIS — R31.9 HEMATURIA, UNSPECIFIED TYPE: ICD-10-CM

## 2019-09-23 PROCEDURE — 76770 US EXAM ABDO BACK WALL COMP: CPT

## 2019-10-17 RX ORDER — FLUOXETINE HYDROCHLORIDE 20 MG/1
20 CAPSULE ORAL DAILY
COMMUNITY

## 2019-10-17 RX ORDER — OXYCODONE HYDROCHLORIDE AND ACETAMINOPHEN 5; 325 MG/1; MG/1
1 TABLET ORAL 3 TIMES DAILY
COMMUNITY

## 2019-10-21 ENCOUNTER — HOSPITAL ENCOUNTER (OUTPATIENT)
Dept: CT IMAGING | Age: 59
Discharge: HOME | End: 2019-10-21
Payer: MEDICAID

## 2019-10-21 DIAGNOSIS — R39.0 EXTRAVASATION OF URINE: ICD-10-CM

## 2019-10-21 DIAGNOSIS — R31.0 GROSS HEMATURIA: ICD-10-CM

## 2019-10-21 DIAGNOSIS — N23 RENAL COLIC: ICD-10-CM

## 2019-10-22 ENCOUNTER — ANESTHESIA EVENT (OUTPATIENT)
Dept: OPERATING ROOM | Age: 59
End: 2019-10-22
Payer: MEDICAID

## 2019-10-23 ENCOUNTER — ANESTHESIA (OUTPATIENT)
Dept: OPERATING ROOM | Age: 59
End: 2019-10-23
Payer: MEDICAID

## 2019-10-23 ENCOUNTER — HOSPITAL ENCOUNTER (OUTPATIENT)
Age: 59
Setting detail: OUTPATIENT SURGERY
Discharge: HOME | End: 2019-10-23
Attending: UROLOGY | Admitting: UROLOGY
Payer: MEDICAID

## 2019-10-23 VITALS
BODY MASS INDEX: 15.7 KG/M2 | OXYGEN SATURATION: 100 % | RESPIRATION RATE: 16 BRPM | WEIGHT: 100 LBS | SYSTOLIC BLOOD PRESSURE: 134 MMHG | DIASTOLIC BLOOD PRESSURE: 69 MMHG | HEART RATE: 73 BPM | HEIGHT: 67 IN | TEMPERATURE: 97 F

## 2019-10-23 VITALS
OXYGEN SATURATION: 87 % | SYSTOLIC BLOOD PRESSURE: 123 MMHG | DIASTOLIC BLOOD PRESSURE: 82 MMHG | RESPIRATION RATE: 21 BRPM

## 2019-10-23 LAB
ANION GAP SERPL CALCULATED.3IONS-SCNC: 10 MMOL/L (ref 3–16)
BUN BLDV-MCNC: 18 MG/DL (ref 7–20)
CALCIUM SERPL-MCNC: 8.7 MG/DL (ref 8.3–10.6)
CHLORIDE BLD-SCNC: 107 MMOL/L (ref 99–110)
CO2: 26 MMOL/L (ref 21–32)
CREAT SERPL-MCNC: 0.6 MG/DL (ref 0.6–1.1)
GFR AFRICAN AMERICAN: >60
GFR NON-AFRICAN AMERICAN: >60
GLUCOSE BLD-MCNC: 98 MG/DL (ref 70–99)
MAGNESIUM: 2 MG/DL (ref 1.8–2.4)
POTASSIUM REFLEX MAGNESIUM: 3.4 MMOL/L (ref 3.5–5.1)
SODIUM BLD-SCNC: 143 MMOL/L (ref 136–145)

## 2019-10-23 PROCEDURE — 2580000003 HC RX 258: Performed by: UROLOGY

## 2019-10-23 PROCEDURE — 7100000011 HC PHASE II RECOVERY - ADDTL 15 MIN: Performed by: UROLOGY

## 2019-10-23 PROCEDURE — 2709999900 HC NON-CHARGEABLE SUPPLY: Performed by: UROLOGY

## 2019-10-23 PROCEDURE — 6360000002 HC RX W HCPCS: Performed by: UROLOGY

## 2019-10-23 PROCEDURE — 6360000002 HC RX W HCPCS: Performed by: NURSE ANESTHETIST, CERTIFIED REGISTERED

## 2019-10-23 PROCEDURE — 3700000000 HC ANESTHESIA ATTENDED CARE: Performed by: UROLOGY

## 2019-10-23 PROCEDURE — 36415 COLL VENOUS BLD VENIPUNCTURE: CPT

## 2019-10-23 PROCEDURE — 7100000010 HC PHASE II RECOVERY - FIRST 15 MIN: Performed by: UROLOGY

## 2019-10-23 PROCEDURE — 2580000003 HC RX 258: Performed by: ANESTHESIOLOGY

## 2019-10-23 PROCEDURE — 3600000004 HC SURGERY LEVEL 4 BASE: Performed by: UROLOGY

## 2019-10-23 PROCEDURE — 2500000003 HC RX 250 WO HCPCS: Performed by: ANESTHESIOLOGY

## 2019-10-23 PROCEDURE — 6370000000 HC RX 637 (ALT 250 FOR IP): Performed by: UROLOGY

## 2019-10-23 PROCEDURE — 83735 ASSAY OF MAGNESIUM: CPT

## 2019-10-23 PROCEDURE — 80048 BASIC METABOLIC PNL TOTAL CA: CPT

## 2019-10-23 RX ORDER — SODIUM CHLORIDE 0.9 % (FLUSH) 0.9 %
10 SYRINGE (ML) INJECTION EVERY 12 HOURS SCHEDULED
Status: DISCONTINUED | OUTPATIENT
Start: 2019-10-23 | End: 2019-10-23 | Stop reason: HOSPADM

## 2019-10-23 RX ORDER — MAGNESIUM HYDROXIDE 1200 MG/15ML
LIQUID ORAL PRN
Status: DISCONTINUED | OUTPATIENT
Start: 2019-10-23 | End: 2019-10-23 | Stop reason: ALTCHOICE

## 2019-10-23 RX ORDER — LABETALOL HYDROCHLORIDE 5 MG/ML
5 INJECTION, SOLUTION INTRAVENOUS EVERY 10 MIN PRN
Status: DISCONTINUED | OUTPATIENT
Start: 2019-10-23 | End: 2019-10-23 | Stop reason: HOSPADM

## 2019-10-23 RX ORDER — ONDANSETRON 2 MG/ML
4 INJECTION INTRAMUSCULAR; INTRAVENOUS PRN
Status: DISCONTINUED | OUTPATIENT
Start: 2019-10-23 | End: 2019-10-23 | Stop reason: HOSPADM

## 2019-10-23 RX ORDER — SODIUM CHLORIDE 0.9 % (FLUSH) 0.9 %
10 SYRINGE (ML) INJECTION PRN
Status: DISCONTINUED | OUTPATIENT
Start: 2019-10-23 | End: 2019-10-23 | Stop reason: HOSPADM

## 2019-10-23 RX ORDER — MORPHINE SULFATE 10 MG/ML
1 INJECTION, SOLUTION INTRAMUSCULAR; INTRAVENOUS EVERY 5 MIN PRN
Status: DISCONTINUED | OUTPATIENT
Start: 2019-10-23 | End: 2019-10-23 | Stop reason: HOSPADM

## 2019-10-23 RX ORDER — ULTRASOUND COUPLING MEDIUM
GEL (GRAM) TOPICAL PRN
Status: DISCONTINUED | OUTPATIENT
Start: 2019-10-23 | End: 2019-10-23 | Stop reason: ALTCHOICE

## 2019-10-23 RX ORDER — HYDRALAZINE HYDROCHLORIDE 20 MG/ML
5 INJECTION INTRAMUSCULAR; INTRAVENOUS EVERY 10 MIN PRN
Status: DISCONTINUED | OUTPATIENT
Start: 2019-10-23 | End: 2019-10-23 | Stop reason: HOSPADM

## 2019-10-23 RX ORDER — LIDOCAINE HYDROCHLORIDE 20 MG/ML
JELLY TOPICAL PRN
Status: DISCONTINUED | OUTPATIENT
Start: 2019-10-23 | End: 2019-10-23 | Stop reason: ALTCHOICE

## 2019-10-23 RX ORDER — MORPHINE SULFATE 10 MG/ML
2 INJECTION, SOLUTION INTRAMUSCULAR; INTRAVENOUS EVERY 5 MIN PRN
Status: DISCONTINUED | OUTPATIENT
Start: 2019-10-23 | End: 2019-10-23 | Stop reason: HOSPADM

## 2019-10-23 RX ORDER — FENTANYL CITRATE 50 UG/ML
INJECTION, SOLUTION INTRAMUSCULAR; INTRAVENOUS PRN
Status: DISCONTINUED | OUTPATIENT
Start: 2019-10-23 | End: 2019-10-23 | Stop reason: SDUPTHER

## 2019-10-23 RX ORDER — PROMETHAZINE HYDROCHLORIDE 25 MG/ML
6.25 INJECTION, SOLUTION INTRAMUSCULAR; INTRAVENOUS
Status: DISCONTINUED | OUTPATIENT
Start: 2019-10-23 | End: 2019-10-23 | Stop reason: HOSPADM

## 2019-10-23 RX ORDER — SULFAMETHOXAZOLE AND TRIMETHOPRIM 400; 80 MG/1; MG/1
1 TABLET ORAL 2 TIMES DAILY
Qty: 8 TABLET | Refills: 0 | Status: SHIPPED | OUTPATIENT
Start: 2019-10-23 | End: 2019-10-27

## 2019-10-23 RX ORDER — SODIUM CHLORIDE, SODIUM LACTATE, POTASSIUM CHLORIDE, CALCIUM CHLORIDE 600; 310; 30; 20 MG/100ML; MG/100ML; MG/100ML; MG/100ML
INJECTION, SOLUTION INTRAVENOUS CONTINUOUS
Status: DISCONTINUED | OUTPATIENT
Start: 2019-10-23 | End: 2019-10-23 | Stop reason: HOSPADM

## 2019-10-23 RX ORDER — PHENAZOPYRIDINE HYDROCHLORIDE 200 MG/1
200 TABLET, FILM COATED ORAL 3 TIMES DAILY PRN
Qty: 15 TABLET | Refills: 0 | Status: SHIPPED | OUTPATIENT
Start: 2019-10-23 | End: 2019-10-28

## 2019-10-23 RX ORDER — PROPOFOL 10 MG/ML
INJECTION, EMULSION INTRAVENOUS PRN
Status: DISCONTINUED | OUTPATIENT
Start: 2019-10-23 | End: 2019-10-23 | Stop reason: SDUPTHER

## 2019-10-23 RX ORDER — DIPHENHYDRAMINE HYDROCHLORIDE 50 MG/ML
12.5 INJECTION INTRAMUSCULAR; INTRAVENOUS
Status: DISCONTINUED | OUTPATIENT
Start: 2019-10-23 | End: 2019-10-23 | Stop reason: HOSPADM

## 2019-10-23 RX ADMIN — FENTANYL CITRATE 50 MCG: 50 INJECTION INTRAMUSCULAR; INTRAVENOUS at 13:09

## 2019-10-23 RX ADMIN — FAMOTIDINE 20 MG: 10 INJECTION, SOLUTION INTRAVENOUS at 11:54

## 2019-10-23 RX ADMIN — PROPOFOL 150 MG: 10 INJECTION, EMULSION INTRAVENOUS at 13:10

## 2019-10-23 RX ADMIN — Medication 2 G: at 13:02

## 2019-10-23 RX ADMIN — SODIUM CHLORIDE, SODIUM LACTATE, POTASSIUM CHLORIDE, AND CALCIUM CHLORIDE: .6; .31; .03; .02 INJECTION, SOLUTION INTRAVENOUS at 11:56

## 2019-10-23 ASSESSMENT — PULMONARY FUNCTION TESTS
PIF_VALUE: 0

## 2019-10-23 ASSESSMENT — PAIN DESCRIPTION - PAIN TYPE: TYPE: CHRONIC PAIN

## 2019-10-23 ASSESSMENT — PAIN SCALES - GENERAL
PAINLEVEL_OUTOF10: 2
PAINLEVEL_OUTOF10: 5

## 2019-10-23 ASSESSMENT — PAIN - FUNCTIONAL ASSESSMENT
PAIN_FUNCTIONAL_ASSESSMENT: 0-10
PAIN_FUNCTIONAL_ASSESSMENT: PREVENTS OR INTERFERES SOME ACTIVE ACTIVITIES AND ADLS

## 2019-10-23 ASSESSMENT — PAIN DESCRIPTION - DESCRIPTORS: DESCRIPTORS: CONSTANT;PRESSURE;BURNING

## 2019-10-30 ENCOUNTER — HOSPITAL ENCOUNTER (OUTPATIENT)
Dept: CT IMAGING | Age: 59
Discharge: HOME | End: 2019-10-30
Payer: MEDICAID

## 2019-10-30 DIAGNOSIS — R39.0 EXTRAVASATION OF URINE: ICD-10-CM

## 2019-10-30 DIAGNOSIS — N23 RENAL COLIC: ICD-10-CM

## 2019-10-30 DIAGNOSIS — R31.0 GROSS HEMATURIA: ICD-10-CM

## 2019-10-30 PROCEDURE — 74178 CT ABD&PLV WO CNTR FLWD CNTR: CPT

## 2019-10-30 PROCEDURE — 6360000004 HC RX CONTRAST MEDICATION: Performed by: UROLOGY

## 2019-10-30 RX ADMIN — IOPAMIDOL 75 ML: 755 INJECTION, SOLUTION INTRAVENOUS at 16:13

## 2020-04-02 ENCOUNTER — TELEPHONE (OUTPATIENT)
Dept: PULMONOLOGY | Age: 60
End: 2020-04-02

## 2020-04-02 NOTE — TELEPHONE ENCOUNTER
orders    [id number] [facility] central scheduling called and is r/s pft and ct lung screen to end of May. New orders will need generated. 5/7/2019      ASSESSMENT:  · Pneumonia is resolved  · COPD  · Tobacco abuse     PLAN:   · She is trying to quit smoking using a nicotine patch  · PFTs next visit  · Obtain past PFTs from [facility]  · She is on anoro and PRN albuterol  · Rx for Anoro  · Screening CT scan was considered in a lung cancer screening counseling and shared decision making visit today that included the following elements:   · Eligibility: Age: 62. There are no signs or symptoms of lung cancer. Tobacco History 40-60 pack-years  · Verbal counseling has been performed by me to include benefits and harms of screening, follow-up diagnostic testing, over-diagnosis, false positive rate, and total radiation exposure;   · I have counseled on the importance of adherence to annual lung cancer LDCT screening, the impact of comorbidities and patient is willing to undergo diagnosis and treatment;   · I have provided counseling on the importance of maintaining cigarette smoking abstinence if former smoker; or the importance of smoking cessation if current smoker and, if appropriate, furnishing of information about tobacco cessation interventions; and   · I have furnished a written order for lung cancer screening with LDCT.    · Order for Screening chest CT scan should be placed with documentation as below:  · Beneficiary date of birth;   · Actual pack - year smoking history (number) from above;   · Current smoking status, and for former smokers, the number of years since quitting smoking from above  · Beneficiary is asymptomatic   · National Provider Identifier (NPI) for Dr. London Solis

## 2020-07-02 ENCOUNTER — HOSPITAL ENCOUNTER (OUTPATIENT)
Dept: CT IMAGING | Age: 60
Discharge: HOME | End: 2020-07-02
Payer: MEDICAID

## 2020-07-02 ENCOUNTER — HOSPITAL ENCOUNTER (OUTPATIENT)
Dept: PULMONOLOGY | Age: 60
Discharge: HOME | End: 2020-07-02
Payer: MEDICAID

## 2020-07-02 PROCEDURE — G0297 LDCT FOR LUNG CA SCREEN: HCPCS

## 2020-07-08 ENCOUNTER — VIRTUAL VISIT (OUTPATIENT)
Dept: PULMONOLOGY | Age: 60
End: 2020-07-08
Payer: MEDICAID

## 2020-07-08 ENCOUNTER — TELEPHONE (OUTPATIENT)
Dept: PULMONOLOGY | Age: 60
End: 2020-07-08

## 2020-07-08 PROBLEM — F17.210 CIGARETTE SMOKER: Status: ACTIVE | Noted: 2020-07-08

## 2020-07-08 PROCEDURE — 99406 BEHAV CHNG SMOKING 3-10 MIN: CPT | Performed by: INTERNAL MEDICINE

## 2020-07-08 PROCEDURE — 99214 OFFICE O/P EST MOD 30 MIN: CPT | Performed by: INTERNAL MEDICINE

## 2020-07-08 RX ORDER — UMECLIDINIUM BROMIDE AND VILANTEROL TRIFENATATE 62.5; 25 UG/1; UG/1
1 POWDER RESPIRATORY (INHALATION) DAILY
Qty: 30 EACH | Refills: 6 | Status: SHIPPED
Start: 2020-07-08 | End: 2020-07-27 | Stop reason: CLARIF

## 2020-07-08 RX ORDER — ALBUTEROL SULFATE 90 UG/1
2 AEROSOL, METERED RESPIRATORY (INHALATION) EVERY 6 HOURS PRN
Qty: 1 INHALER | Refills: 6 | Status: SHIPPED | OUTPATIENT
Start: 2020-07-08 | End: 2021-09-13

## 2020-07-08 NOTE — PROGRESS NOTES
[facility]    Outpatient Follow Up Note    Chief Complaint: abnormal ct scan  Consulting provider: No ref. provider found    Interval History: 61 y.o. female SOB worse in he humidity. Smoking 1/7th PPD    Initial HPI:regarding an abnormal Chest CT. She presented to the emergency room in January and was treated for pneumonia which was found on her CT scan. The pneumonia had some nodular components and the patient was referred for follow-up. The patient has a history of smoking and COPD. The patient does not have SOB at rest but has ACKERMAN. Exercise tolerance on level ground is not limited. The patient has a daily intermittent cough that is usually dry or sometimes productive of yellow sputum. The patient does wheeze intermittently. No fever.      The patient has smoked 1- 1.5 PPD for 40 years. Environmental/chemical exposure: Asbestos exposure: no  Patient  is on disability    Current Outpatient Medications:     ANORO ELLIPTA 62.5-25 MCG/INH AEPB inhaler, INHALE ONE DOSE BY MOUTH DAILY, Disp: 1 each, Rfl: 1    oxyCODONE-acetaminophen (PERCOCET) 5-325 MG per tablet, Take 1 tablet by mouth 3 times daily.  , Disp: , Rfl:     FLUoxetine (PROZAC) 20 MG capsule, Take 20 mg by mouth daily, Disp: , Rfl:     meloxicam (MOBIC) 15 MG tablet, Take 7.5 mg by mouth 2 times daily , Disp: , Rfl:     albuterol sulfate  (90 Base) MCG/ACT inhaler, Inhale 2 puffs into the lungs every 6 hours as needed for Wheezing, Disp: , Rfl:     Cholecalciferol (VITAMIN D3) 5000 units CAPS, , Disp: , Rfl:     albuterol (PROVENTIL) (2.5 MG/3ML) 0.083% nebulizer solution, Take 3 mLs by nebulization every 6 hours as needed for Wheezing, Disp: 60 vial, Rfl: 0    alendronate-cholecalciferol (FOSAMAX PLUS D)  MG-UNIT per tablet, Take 1 tablet by mouth every 7 days, Disp: 4 tablet, Rfl: 0    levothyroxine (SYNTHROID) 88 MCG tablet, Take 1 tablet by mouth Daily (Patient taking differently: Take 50 mcg by mouth Daily ), Disp: 30 tablet, Rfl: 0    Objective:   PHYSICAL EXAM: There were no vitals taken for this visit. Constitutional:  No acute distress. Appears well developed and nourished. Eyes: EOM intact. Conjunctivae anicteric. No visible discharge. HENT: Head is normocephalic and atraumatic. Mucus membranes are moist and the tongue appears normal. Normal appearing nose. External Ears normal.   Neck: No obvious mass and the trachea is midline. Respiratory: No accessory muscle usage. Respiratory effort normal. No visualized signs of difficulty breathing or respiratory distress  Psychiatric: No anxiety or Agitation. Alert and Oriented to person, place and time. Normal judgement and insight. LABS:  Reviewed any pertinent new labs that are available. PFTs Date  FVC  (%) FEV1  (%) FEV1/FVC ratio    TLC  (%)  RV  (%)   DLCO (%) Bronchodilator response:    IMAGING:  I personally reviewed and interpreted the following today in the office:   5/3/19 Chest CT:  1. Near complete resolution of airspace changes in the lingula. 2. Resolution of airspace changes in the right middle and lower lobes as well   with no residual nodules.  Calcified granulomata are stable.      7/2/20 LDCT  New noncalcified nodule in the lingula measuring 4 mm.       Otherwise, no significant change in underlying emphysema, scattered areas of   bronchial wall thickening-mucous plugging.  Tiny pulmonary nodule in the   right upper lobe is also unchanged. ASSESSMENT:  · COPD  · Tobacco abuse  · 4mm lingular lung nodule - follow per fleischner guidelines     PLAN:   · She is trying to quit smoking using a nicotine patch  · Did not complete PFts - will r/s  · Continue anoro and PRN albuterol - refill x 6  · We discussed smoking cessation for >3 minutes. We discussed the association of smoking with the patient's current symptoms and the risks of continued use and the options for achieving cessation.  The patient was advised to set a quit date and alert family members and remove smoking paraphernalia before the quit date. She currently is in the contemplative phase of quitting and will continue to consider specific actions. Nicotine replacement therapy was offered to the patient. · F/u in 6 months     Fleischner Society Guidelines   Recommendations for follow-up and management of nodules smaller than 8 mm detected incidentally at nonscreening CT   Nodule size (mm)* Low-risk patient High-risk patient?   ?4 No follow-up needed? Follow-up CT at 12 months; if unchanged, no further follow-up§   >4-6 Follow-up CT at 12 months; if unchanged, no further follow-up§ Initial follow-up CT at 6-12 months then at 18-24 months if no change§   >6-8 Initial follow-up CT at 6-12 months then at 18-24 months if no change Initial follow-up CT at 3-6 months then at 9-12 and 24 months if no change   >8 Follow-up CT at around 3, 9, and 24 months, dynamic contrast-enhanced CT, PET, and/or biopsy  Same as for low-risk patient   · Note: Newly detected indeterminate nodule in persons 28years of age or older. * Average of length and width. · Screening CT scan was considered in a lung cancer screening counseling and shared decision making visit today that included the following elements:   · Eligibility: Age: 61. There are no signs or symptoms of lung cancer.   Tobacco History 40-60 pack-years  · Verbal counseling has been performed by me to include benefits and harms of screening, follow-up diagnostic testing, over-diagnosis, false positive rate, and total radiation exposure;   · I have counseled on the importance of adherence to annual lung cancer LDCT screening, the impact of comorbidities and patient is willing to undergo diagnosis and treatment;   · I have provided counseling on the importance of maintaining cigarette smoking abstinence if former smoker; or the importance of smoking cessation if current smoker and, if appropriate, furnishing of information about tobacco

## 2020-07-08 NOTE — TELEPHONE ENCOUNTER
limited to the following:    Your Provider(s) may not able to provide medical treatment for your particular condition and you may be required to seek alternative healthcare or emergency care services.  The electronic systems or other security protocols or safeguards used in the Service could fail, causing a breach of privacy of your medical or other information.  Given regulatory requirements in certain jurisdictions, your Provider(s) diagnosis and/or treatment options, especially pertaining to certain prescriptions, may be limited. Acceptance   1. You understand that Services will be provided via Telehealth. This process involves the use of HIPAA compliant and secure, real-time audio-visual interfacing with a qualified and appropriately trained provider located at [facility]. 2. You understand that, under no circumstances, will this session be recorded. 3. You understand that the Provider(s) at [facility] and other clinical participants will be party to the information obtained during the Telehealth session in accordance with best medical practices. 4. You understand that the information obtained during the Telehealth session will be used to help determine the most appropriate treatment options. 5. You understand that You have the right to revoke this consent at any point in time. 6. You understand that Telehealth is voluntary, and that continued treatment is not dependent upon consent. 7. You understand that, in the event of non-consent to Telehealth services and/or technical difficulties, you will obtain services as typically provided in the absence of Telehealth technology. 8. You understand that this consent will be kept in Your medical record. 9. No potential benefits from the use of Telehealth or specific results can be guaranteed. Your condition may not be cured or improved and, in some cases, may get worse.    10. There are limitations in

## 2020-07-24 ENCOUNTER — TELEPHONE (OUTPATIENT)
Dept: PULMONOLOGY | Age: 60
End: 2020-07-24

## 2020-09-02 ENCOUNTER — TELEPHONE (OUTPATIENT)
Dept: PULMONOLOGY | Age: 60
End: 2020-09-02

## 2020-09-02 RX ORDER — INDACATEROL MALEATE AND GLYCOPYRROLATE 27.5; 15.6 UG/1; UG/1
CAPSULE RESPIRATORY (INHALATION)
Qty: 60 CAPSULE | Refills: 5 | Status: SHIPPED | OUTPATIENT
Start: 2020-09-02 | End: 2020-12-18

## 2020-09-02 NOTE — TELEPHONE ENCOUNTER
Pt called and said that the Bevespi inhaler is not working for her. Pt said that she can not breath. Pt said that she has been getting really bad dizzy spells and shakes through out the day. Pt is asking for an RX for a different inhaler. Pt uses [facility]    LOV: 7/8/20  ASSESSMENT:  · COPD  · Tobacco abuse  · 4mm lingular lung nodule - follow per fleischner guidelines     PLAN:   · She is trying to quit smoking using a nicotine patch  · Did not complete PFts - will r/s  · Continue anoro and PRN albuterol - refill x 6  · We discussed smoking cessation for >3 minutes. We discussed the association of smoking with the patient's current symptoms and the risks of continued use and the options for achieving cessation. The patient was advised to set a quit date and alert family members and remove smoking paraphernalia before the quit date. She currently is in the contemplative phase of quitting and will continue to consider specific actions. Nicotine replacement therapy was offered to the patient.    · F/u in 6 months

## 2020-09-29 ENCOUNTER — INITIAL CONSULT (OUTPATIENT)
Dept: GASTROENTEROLOGY | Age: 60
End: 2020-09-29
Payer: MEDICAID

## 2020-09-29 VITALS
SYSTOLIC BLOOD PRESSURE: 150 MMHG | HEIGHT: 67 IN | BODY MASS INDEX: 16.17 KG/M2 | DIASTOLIC BLOOD PRESSURE: 80 MMHG | WEIGHT: 103 LBS | TEMPERATURE: 96.7 F

## 2020-09-29 PROBLEM — K52.9 CHRONIC DIARRHEA: Status: ACTIVE | Noted: 2020-09-29

## 2020-09-29 PROBLEM — R19.7 DIARRHEA: Status: ACTIVE | Noted: 2020-09-29

## 2020-09-29 PROBLEM — R10.13 EPIGASTRIC PAIN: Status: ACTIVE | Noted: 2020-09-29

## 2020-09-29 PROCEDURE — G8427 DOCREV CUR MEDS BY ELIG CLIN: HCPCS | Performed by: INTERNAL MEDICINE

## 2020-09-29 PROCEDURE — 3017F COLORECTAL CA SCREEN DOC REV: CPT | Performed by: INTERNAL MEDICINE

## 2020-09-29 PROCEDURE — 4004F PT TOBACCO SCREEN RCVD TLK: CPT | Performed by: INTERNAL MEDICINE

## 2020-09-29 PROCEDURE — 99204 OFFICE O/P NEW MOD 45 MIN: CPT | Performed by: INTERNAL MEDICINE

## 2020-09-29 PROCEDURE — G8419 CALC BMI OUT NRM PARAM NOF/U: HCPCS | Performed by: INTERNAL MEDICINE

## 2020-09-29 RX ORDER — OMEPRAZOLE 20 MG/1
20 CAPSULE, DELAYED RELEASE ORAL DAILY
COMMUNITY
Start: 2020-09-23

## 2020-09-29 NOTE — PROGRESS NOTES
Anastasia Leventhal    [address] [place] [address] ,  557 [facility]  Phone: [phone]    CHIEF COMPLAINT     Chief Complaint   Patient presents with   [address]     NP- abd pain, diarrhea        HPI     Juan Luis Joseph is a 61 y.o. female who presents for abdominal pain and diarrhea. PMH of COPD, HTN. She says she has had abdominal pain in the epigastric region for 1 month, pain is constant, 8/10, pain is worse with eating food, feels 'like a wringing pain'. Pain is not associated with nausea or emesis. Not taking any NSAIDs. Not tried PPI. Says PCP had ordered a US for her gallbladder but she missed the appt and will reschedule this. No prior EGDs. She also has chronic diarrhea going back >10 years. Says she 'sometimes spends the whole day in the toilet', says she has >4 loose daily stools a day. She denies blood in the stools. She just had a colonoscopy in [facility], [place] last year, says 'some polyps were removed', report not currently available but will be requested. Denies prior abdominal surgery. Says her mother had ? Crohns disease. Denies family history of colon cancer in first degree family members. She has a 40 pack year smoking history. PAST MEDICAL HISTORY     Past Medical History:   Diagnosis Date    COPD (chronic obstructive pulmonary disease) ([facility] [address]     Hypertension      FAMILY HISTORY   No family history on file.   SOCIAL HISTORY     Social History     Socioeconomic History    Marital status: Legally      Spouse name: Not on file    Number of children: Not on file    Years of education: Not on file    Highest education level: Not on file   Occupational History    Not on file   Social Needs    Financial resource strain: Not on file    Food insecurity     Worry: Not on file     Inability: Not on file    Transportation needs     Medical: Not on file     Non-medical: Not on file   Tobacco Use    Smoking status: Current Every Day Smoker Packs/day: 1.00     Years: 40.00     Pack years: 40.00     Types: Cigarettes    Smokeless tobacco: Never Used   Substance and Sexual Activity    Alcohol use: No    Drug use: No    Sexual activity: Not on file   Lifestyle    Physical activity     Days per week: Not on file     Minutes per session: Not on file    Stress: Not on file   Relationships    Social connections     Talks on phone: Not on file     Gets together: Not on file     Attends Oriental orthodox service: Not on file     Active member of club or organization: Not on file     Attends meetings of clubs or organizations: Not on file     Relationship status: Not on file    Intimate partner violence     Fear of current or ex partner: Not on file     Emotionally abused: Not on file     Physically abused: Not on file     Forced sexual activity: Not on file   Other Topics Concern    Not on file   Social History Narrative    Not on file     SURGICAL HISTORY     Past Surgical History:   Procedure Laterality Date    BREAST LUMPECTOMY  2015     SECTION      CYSTOSCOPY  10/23/2019    CYSTOSCOPY, URETHRAL DILATATION     CYSTOSCOPY N/A 10/23/2019    CYSTOSCOPY, URETHRAL DILATATION performed by Jacob Carpenter MD at [address]   (This list may include medications prescribed during this encounter as epic can not insert only the list prior to this encounter.)  Current Outpatient Rx   Medication Sig Dispense Refill    omeprazole (PRILOSEC) 20 MG delayed release capsule       Indacaterol-Glycopyrrolate (UTIBRON NEOHALER) 27.5-15.6 MCG CAPS 1 inhalation bid 60 capsule 5    albuterol sulfate  (90 Base) MCG/ACT inhaler Inhale 2 puffs into the lungs every 6 hours as needed for Wheezing 1 Inhaler 6    oxyCODONE-acetaminophen (PERCOCET) 5-325 MG per tablet Take 1 tablet by mouth 3 times daily.        FLUoxetine (PROZAC) 20 MG capsule Take 20 mg by mouth daily      meloxicam (MOBIC) 15 MG tablet Take 7.5 mg by mouth 2 times daily       Cholecalciferol (VITAMIN D3) 5000 units CAPS       albuterol (PROVENTIL) (2.5 MG/3ML) 0.083% nebulizer solution Take 3 mLs by nebulization every 6 hours as needed for Wheezing 60 vial 0    alendronate-cholecalciferol (FOSAMAX PLUS D)  MG-UNIT per tablet Take 1 tablet by mouth every 7 days 4 tablet 0    levothyroxine (SYNTHROID) 88 MCG tablet Take 1 tablet by mouth Daily (Patient taking differently: Take 50 mcg by mouth Daily ) 30 tablet 0        ALLERGIES     Allergies   Allergen Reactions    Codeine Nausea Only    Contrast [Iodides]     Vicodin [Hydrocodone-Acetaminophen] Other (See Comments)     GI upset       IMMUNIZATIONS     Immunization History   Administered Date(s) Administered    Influenza Virus Vaccine 10/01/2018    Influenza, Quadv, IM, (6 mo and older Fluzone, Flulaval, Fluarix and 3 yrs and older Afluria) 10/01/2018       REVIEW OF SYSTEMS     Constitutional: denies fever and unexpected weight change. HENT: Negative for ear pain, hearing loss and nosebleeds. Eyes: Negative for pain and visual disturbance. Respiratory: Negative for cough, shortness of breath and wheezing. Cardiovascular: Negative for chest pain, palpitations and leg swelling. Gastrointestinal: see HPI for details. Endocrine: Negative for polydipsia, polyphagia and polyuria. Genitourinary: Negative for difficulty urinating, dysuria, hematuria and urgency. Musculoskeletal: Positive for arthralgias and back pain. Skin: Negative for pallor and rash. Allergic/Immunologic: Negative for environmental allergies and immunocompromised state. Neurological: Negative for seizures, syncope. Hematological: Negative for adenopathy. Does not bruise/bleed easily. Psychiatric/Behavioral: Negative for agitation, confusion, hallucinations.     PHYSICAL EXAM   VITAL SIGNS: BP (!) 150/80 (Site: Left Upper Arm)   Temp 96.7 °F (35.9 °C)   Ht 5' 7\" (1.702 m)   Wt 103 lb (46.7 kg)   BMI 16.13 kg/m²   Wt Readings from Last 3 Encounters:   09/29/20 103 lb (46.7 kg)   10/23/19 100 lb (45.4 kg)   05/07/19 99 lb (44.9 kg)     Gen: AAO3, NAD  HEENT: no pallor or icterus  Neck: supple, no adenopathy  RS: clear to auscultation bilaterally  CVS: S1S2 RRR, no murmurs  GI: soft, nontender, not distended, BS+, no hepatosplenomegaly  Ext: no edema or clubbing      FINAL IMPRESSION     Epigastric pain - constant but worse with food, for 1 month. Trial of omeprazole 20 mg daily, take 30 minutes before breakfast.  An EGD is recommended to rule out peptic ulcer disease, H pylori, large hiatal hernia or other significant UGI pathology. Procedure discussed with patient in detail. She says her PCP already ordered a gallbladder US but she has not done this, will send us the report when she reschedules. Chronic diarrhea - longstanding, just had a colonoscopy last year at [facility], [place], will request the report. Check stool studies.

## 2020-10-06 ENCOUNTER — HOSPITAL ENCOUNTER (OUTPATIENT)
Age: 60
Discharge: HOME | End: 2020-10-06
Payer: MEDICAID

## 2020-10-06 PROCEDURE — U0003 INFECTIOUS AGENT DETECTION BY NUCLEIC ACID (DNA OR RNA); SEVERE ACUTE RESPIRATORY SYNDROME CORONAVIRUS 2 (SARS-COV-2) (CORONAVIRUS DISEASE [COVID-19]), AMPLIFIED PROBE TECHNIQUE, MAKING USE OF HIGH THROUGHPUT TECHNOLOGIES AS DESCRIBED BY CMS-2020-01-R: HCPCS

## 2020-10-08 LAB — SARS-COV-2, NAA: NOT DETECTED

## 2020-10-10 ENCOUNTER — HOSPITAL ENCOUNTER (OUTPATIENT)
Dept: ULTRASOUND IMAGING | Age: 60
Discharge: HOME | End: 2020-10-10
Payer: MEDICAID

## 2020-10-10 PROCEDURE — 76705 ECHO EXAM OF ABDOMEN: CPT

## 2020-10-11 ENCOUNTER — ANESTHESIA EVENT (OUTPATIENT)
Dept: ENDOSCOPY | Age: 60
End: 2020-10-11
Payer: MEDICAID

## 2020-10-12 ENCOUNTER — TELEPHONE (OUTPATIENT)
Dept: GASTROENTEROLOGY | Age: 60
End: 2020-10-12

## 2020-10-12 ENCOUNTER — HOSPITAL ENCOUNTER (OUTPATIENT)
Age: 60
Setting detail: OUTPATIENT SURGERY
Discharge: HOME | End: 2020-10-12
Attending: INTERNAL MEDICINE | Admitting: INTERNAL MEDICINE
Payer: MEDICAID

## 2020-10-12 ENCOUNTER — ANESTHESIA (OUTPATIENT)
Dept: ENDOSCOPY | Age: 60
End: 2020-10-12
Payer: MEDICAID

## 2020-10-12 VITALS
BODY MASS INDEX: 16.55 KG/M2 | HEART RATE: 71 BPM | OXYGEN SATURATION: 95 % | SYSTOLIC BLOOD PRESSURE: 109 MMHG | WEIGHT: 103 LBS | HEIGHT: 66 IN | TEMPERATURE: 97.2 F | RESPIRATION RATE: 18 BRPM | DIASTOLIC BLOOD PRESSURE: 67 MMHG

## 2020-10-12 PROCEDURE — 3700000001 HC ADD 15 MINUTES (ANESTHESIA): Performed by: INTERNAL MEDICINE

## 2020-10-12 PROCEDURE — 3700000000 HC ANESTHESIA ATTENDED CARE: Performed by: INTERNAL MEDICINE

## 2020-10-12 PROCEDURE — 6360000002 HC RX W HCPCS: Performed by: NURSE ANESTHETIST, CERTIFIED REGISTERED

## 2020-10-12 PROCEDURE — 3609012400 HC EGD TRANSORAL BIOPSY SINGLE/MULTIPLE: Performed by: INTERNAL MEDICINE

## 2020-10-12 PROCEDURE — 2709999900 HC NON-CHARGEABLE SUPPLY: Performed by: INTERNAL MEDICINE

## 2020-10-12 PROCEDURE — 2580000003 HC RX 258: Performed by: NURSE ANESTHETIST, CERTIFIED REGISTERED

## 2020-10-12 PROCEDURE — 88305 TISSUE EXAM BY PATHOLOGIST: CPT

## 2020-10-12 PROCEDURE — 43239 EGD BIOPSY SINGLE/MULTIPLE: CPT | Performed by: INTERNAL MEDICINE

## 2020-10-12 PROCEDURE — 7100000011 HC PHASE II RECOVERY - ADDTL 15 MIN: Performed by: INTERNAL MEDICINE

## 2020-10-12 PROCEDURE — 7100000010 HC PHASE II RECOVERY - FIRST 15 MIN: Performed by: INTERNAL MEDICINE

## 2020-10-12 PROCEDURE — 2500000003 HC RX 250 WO HCPCS: Performed by: NURSE ANESTHETIST, CERTIFIED REGISTERED

## 2020-10-12 RX ORDER — LIDOCAINE HYDROCHLORIDE 20 MG/ML
INJECTION, SOLUTION INFILTRATION; PERINEURAL PRN
Status: DISCONTINUED | OUTPATIENT
Start: 2020-10-12 | End: 2020-10-12 | Stop reason: SDUPTHER

## 2020-10-12 RX ORDER — PROPOFOL 10 MG/ML
INJECTION, EMULSION INTRAVENOUS PRN
Status: DISCONTINUED | OUTPATIENT
Start: 2020-10-12 | End: 2020-10-12 | Stop reason: SDUPTHER

## 2020-10-12 RX ORDER — SODIUM CHLORIDE, SODIUM LACTATE, POTASSIUM CHLORIDE, CALCIUM CHLORIDE 600; 310; 30; 20 MG/100ML; MG/100ML; MG/100ML; MG/100ML
INJECTION, SOLUTION INTRAVENOUS CONTINUOUS PRN
Status: DISCONTINUED | OUTPATIENT
Start: 2020-10-12 | End: 2020-10-12 | Stop reason: SDUPTHER

## 2020-10-12 RX ADMIN — PROPOFOL 300 MG: 10 INJECTION, EMULSION INTRAVENOUS at 07:43

## 2020-10-12 RX ADMIN — LIDOCAINE HYDROCHLORIDE 50 MG: 20 INJECTION, SOLUTION INFILTRATION; PERINEURAL at 07:43

## 2020-10-12 RX ADMIN — SODIUM CHLORIDE, POTASSIUM CHLORIDE, SODIUM LACTATE AND CALCIUM CHLORIDE: 600; 310; 30; 20 INJECTION, SOLUTION INTRAVENOUS at 07:32

## 2020-10-12 ASSESSMENT — COPD QUESTIONNAIRES: CAT_SEVERITY: MILD

## 2020-10-12 ASSESSMENT — PAIN DESCRIPTION - DESCRIPTORS: DESCRIPTORS: ACHING

## 2020-10-12 ASSESSMENT — PAIN SCALES - GENERAL: PAINLEVEL_OUTOF10: 0

## 2020-10-12 ASSESSMENT — PAIN - FUNCTIONAL ASSESSMENT: PAIN_FUNCTIONAL_ASSESSMENT: 0-10

## 2020-10-12 NOTE — PROGRESS NOTES
Pt is alert and oriented, stable for discharge to home, iv out, cath intact, pressure and dressing applied, pt tolerated well. Pt and family received printed and verbal instructions for post op care at home, and they denied any further questions. Pt taken out via wheelchair to family's car.       The doctor did a face to face update for both the pt and her family    No new prescriptions ordered for this visit

## 2020-10-12 NOTE — OP NOTE
[address] ,  [address] [facility]  Phone: [phone]    EGD Procedure Note    Patient: Corinne Penning  : 1960    Procedure: Esophagogastroduodenoscopy with biopsy    Date:  10/12/2020     Endoscopist:  Nidia Gonzalez MD    Referring Physician:  Jovan Lozoya, APRN - CNP    Preoperative Diagnosis:  Epigastric pain [R10.13]    Postoperative Diagnosis:  Epigastric pain [R10.13]    Anesthesia: Anesthesia: MAC  Sedation: see anesthesia notes for details. Start Time: 7:47  Stop Time: 7:54  ASA Class: 3  Mallampati: II (soft palate, uvula, fauces visible)    Indications: This is a 61y.o. year old female who presents today with epigastric pain for EGD. Procedure Details  Informed consent was obtained for the procedure, including conscious sedation. Risks of pancreatitis, infection, perforation, hemorrhage, adverse drug reaction and aspiration were discussed. The patient was placed in the left lateral decubitus position. Based on the pre-procedure assessment, including review of the patient's medical history, medications, allergies, and review of systems, she had been deemed to be an appropriate candidate for conscious sedation; she was therefore sedated with the medications listed above. She was monitored continuously with ECG tracing, pulse oximetry, blood pressure monitoring, and direct observation. A gastroscope was inserted into the mouth and advanced under direct vision to second portion of the duodenum. A careful inspection was made as the gastroscope was withdrawn, including a retroflexed view of the proximal stomach; findings and interventions are described below. Appropriate photodocumentation was obtained. If photos taken, they were ordered to be scanned into the medical record. Findings: -The esophagus is normal. Mild gastritis in the gastric body and antrum, biopsies done to rule out H pylori.  Normal duodenum to the second portion, biopsies done to rule out celiac disease. Specimens: Was Obtained: bottle A, gastritis, gastric body and antrum, biopsies, r/o H pylori      Complications: None      Disposition:   PACU - hemodynamically stable. Estimated Blood loss:  minimal    Impression:   -The esophagus is normal. Mild gastritis in the gastric body and antrum, biopsies done to rule out H pylori. Normal duodenum to the second portion, biopsies done to rule out celiac disease. Recommendations:  -Continue acid suppression. ,   -Await pathology. - US 10/10/2020 shows gallbladder sludge but no stones. EGD does not show significant pathology so will get a HIDA scan to assess for gallbladder dyskinesia.         Alvaro Lacy 10/12/20 7:45 AM EDT

## 2020-10-12 NOTE — ANESTHESIA PRE PROCEDURE
Department of Anesthesiology  Preprocedure Note       Name:  Akanksha Jordan   Age:  61 y.o.  :  1960                                          MRN:  [id number]         Date:  10/12/2020      Surgeon: Yonathan England):  Jarrod Lara MD    Procedure: Procedure(s):  EGD W/ANES. (8:00)    Medications prior to admission:   Prior to Admission medications    Medication Sig Start Date End Date Taking? Authorizing Provider   omeprazole (PRILOSEC) 20 MG delayed release capsule  20   Historical Provider, MD   Indacaterol-Glycopyrrolate (Gabriel Leroy) 27.5-15.6 MCG CAPS 1 inhalation bid 20   Stalin Navarro MD   albuterol sulfate  (90 Base) MCG/ACT inhaler Inhale 2 puffs into the lungs every 6 hours as needed for Wheezing 20   Stalin Navarro MD   oxyCODONE-acetaminophen (PERCOCET) 5-325 MG per tablet Take 1 tablet by mouth 3 times daily. Historical Provider, MD   FLUoxetine (PROZAC) 20 MG capsule Take 20 mg by mouth daily    Historical Provider, MD   meloxicam (MOBIC) 15 MG tablet Take 7.5 mg by mouth 2 times daily     Historical Provider, MD   Cholecalciferol (VITAMIN D3) 5000 units CAPS  19   Historical Provider, MD   albuterol (PROVENTIL) (2.5 MG/3ML) 0.083% nebulizer solution Take 3 mLs by nebulization every 6 hours as needed for Wheezing 19   Clive Bernstein PA-C   alendronate-cholecalciferol (FOSAMAX PLUS D)  MG-UNIT per tablet Take 1 tablet by mouth every 7 days 19   Nichole Long MD   levothyroxine (SYNTHROID) 88 MCG tablet Take 1 tablet by mouth Daily  Patient taking differently: Take 50 mcg by mouth Daily  19   Nichole Long MD       Current medications:    No current facility-administered medications for this encounter. Allergies:     Allergies   Allergen Reactions    Codeine Nausea Only    Contrast [Iodides]     Vicodin [Hydrocodone-Acetaminophen] Other (See Comments)     GI upset       Problem List:    Patient Active Problem List Diagnosis Code    Cigarette smoker F17.210    Epigastric pain R10.13    Chronic diarrhea K52.9    Diarrhea R19.7       Past Medical History:        Diagnosis Date    COPD (chronic obstructive pulmonary disease) ([address]     Hypertension        Past Surgical History:        Procedure Laterality Date    BREAST LUMPECTOMY  2015     SECTION      CYSTOSCOPY  10/23/2019    CYSTOSCOPY, URETHRAL DILATATION     CYSTOSCOPY N/A 10/23/2019    CYSTOSCOPY, URETHRAL DILATATION performed by Harrison Roach MD at [address] [place] [address]          Social History:    Social History     Tobacco Use    Smoking status: Current Every Day Smoker     Packs/day: 1.00     Years: 40.00     Pack years: 40.00     Types: Cigarettes    Smokeless tobacco: Never Used   Substance Use Topics    Alcohol use: No                                Ready to quit: Not Answered  Counseling given: Not Answered      Vital Signs (Current): There were no vitals filed for this visit.                                            BP Readings from Last 3 Encounters:   20 (!) 150/80   10/23/19 123/82   10/23/19 134/69       NPO Status: Time of last liquid consumption:                         Time of last solid consumption:                         Date of last liquid consumption: 10/11/20                        Date of last solid food consumption: 10/11/20    BMI:   Wt Readings from Last 3 Encounters:   20 103 lb (46.7 kg)   10/23/19 100 lb (45.4 kg)   19 99 lb (44.9 kg)     There is no height or weight on file to calculate BMI.    CBC:   Lab Results   Component Value Date    WBC 10.4 2019    RBC 4.12 2019    HGB 13.6 2019    HCT 40.2 2019    MCV 97.4 2019    RDW 13.2 2019     2019       CMP:   Lab Results   Component Value Date     10/23/2019    K 3.4 10/23/2019     10/23/2019    CO2 26 10/23/2019    BUN 18 10/23/2019    CREATININE 0.6 10/23/2019    GFRAA >60 10/23/2019    AGRATIO 1.1 01/29/2019    LABGLOM >60 10/23/2019    GLUCOSE 98 10/23/2019    PROT 6.6 01/29/2019    CALCIUM 8.7 10/23/2019    BILITOT 0.3 01/29/2019    ALKPHOS 59 01/29/2019    AST 13 01/29/2019    ALT 11 01/29/2019       POC Tests: No results for input(s): POCGLU, POCNA, POCK, POCCL, POCBUN, POCHEMO, POCHCT in the last 72 hours. Coags: No results found for: PROTIME, INR, APTT    HCG (If Applicable): No results found for: PREGTESTUR, PREGSERUM, HCG, HCGQUANT     ABGs: No results found for: PHART, PO2ART, GBY0ZYV, KGN4QUT, BEART, U1GWBGWJ     Type & Screen (If Applicable):  No results found for: LABABO, LABRH    Drug/Infectious Status (If Applicable):  No results found for: HIV, HEPCAB    COVID-19 Screening (If Applicable):   Lab Results   Component Value Date    COVID19 NOT DETECTED 10/06/2020         Anesthesia Evaluation  Patient summary reviewed and Nursing notes reviewed  Airway: Mallampati: I  TM distance: >3 FB   Neck ROM: full  Mouth opening: > = 3 FB Dental:      Comment: Poor dentition    Pulmonary:normal exam  breath sounds clear to auscultation  (+) COPD: mild,                             Cardiovascular:    (+) hypertension:,         Rhythm: regular  Rate: normal                    Neuro/Psych:   Negative Neuro/Psych ROS              GI/Hepatic/Renal: Neg GI/Hepatic/Renal ROS            Endo/Other: Negative Endo/Other ROS                    Abdominal:           Vascular: negative vascular ROS. Anesthesia Plan      MAC     ASA 3       Induction: intravenous. Anesthetic plan and risks discussed with patient. Plan discussed with CRNA.                   Irasema Medeiros MD   10/12/2020

## 2020-10-12 NOTE — H&P
Chief Complaint   Patient presents with   [facility] Establish Care       NP- abd pain, diarrhea         HPI      May [place] is a 61 y.o. female who presents for abdominal pain and diarrhea. PMH of COPD, HTN. She says she has had abdominal pain in the epigastric region for 1 month, pain is constant, 8/10, pain is worse with eating food, feels 'like a wringing pain'. Pain is not associated with nausea or emesis. Not taking any NSAIDs. Not tried PPI. Says PCP had ordered a US for her gallbladder but she missed the appt and will reschedule this. No prior EGDs. She also has chronic diarrhea going back >10 years. Says she 'sometimes spends the whole day in the toilet', says she has >4 loose daily stools a day. She denies blood in the stools. She just had a colonoscopy in [facility], [place] last year, says 'some polyps were removed', report not currently available but will be requested. Denies prior abdominal surgery. Says her mother had ? Crohns disease. Denies family history of colon cancer in first degree family members.     She has a 40 pack year smoking history.        PAST MEDICAL HISTORY      Past Medical History        Past Medical History:   Diagnosis Date    COPD (chronic obstructive pulmonary disease) ([address]      Hypertension          FAMILY HISTORY   Family History   No family history on file.      SOCIAL HISTORY      Social History               Socioeconomic History    Marital status: Legally        Spouse name: Not on file    Number of children: Not on file    Years of education: Not on file    Highest education level: Not on file   Occupational History    Not on file   Social Needs    Financial resource strain: Not on file    Food insecurity       Worry: Not on file       Inability: Not on file    Transportation needs       Medical: Not on file       Non-medical: Not on file   Tobacco Use    Smoking status: Current Every Day Smoker       Packs/day: 1.00       Years: 40.00       Pack years: 40.00     Types: Cigarettes    Smokeless tobacco: Never Used   Substance and Sexual Activity    Alcohol use: No    Drug use: No    Sexual activity: Not on file   Lifestyle    Physical activity       Days per week: Not on file       Minutes per session: Not on file    Stress: Not on file   Relationships    Social connections       Talks on phone: Not on file       Gets together: Not on file       Attends [employer] service: Not on file       Active member of club or organization: Not on file       Attends meetings of clubs or organizations: Not on file       Relationship status: Not on file    Intimate partner violence       Fear of current or ex partner: Not on file       Emotionally abused: Not on file       Physically abused: Not on file       Forced sexual activity: Not on file   Other Topics Concern    Not on file   Social History Narrative    Not on file        SURGICAL HISTORY      Past Surgical History         Past Surgical History:   Procedure Laterality Date    BREAST LUMPECTOMY   1     SECTION        CYSTOSCOPY   10/23/2019     CYSTOSCOPY, URETHRAL DILATATION     CYSTOSCOPY N/A 10/23/2019     CYSTOSCOPY, URETHRAL DILATATION performed by Basia Miranda MD at [address] [place] [address]   (This list may include medications prescribed during this encounter as epic can not insert only the list prior to this encounter.)  Current Outpatient Rx          Current Outpatient Rx   Medication Sig Dispense Refill    omeprazole (PRILOSEC) 20 MG delayed release capsule          Indacaterol-Glycopyrrolate (UTIBRON NEOHALER) 27.5-15.6 MCG CAPS 1 inhalation bid 60 capsule 5    albuterol sulfate  (90 Base) MCG/ACT inhaler Inhale 2 puffs into the lungs every 6 hours as needed for Wheezing 1 Inhaler 6    oxyCODONE-acetaminophen (PERCOCET) 5-325 MG per tablet Take 1 tablet by mouth 3 times daily.         FLUoxetine (PROZAC) 20 MG capsule Take 20 mg by mouth daily        meloxicam (MOBIC) 15 MG tablet Take 7.5 mg by mouth 2 times daily         Cholecalciferol (VITAMIN D3) 5000 units CAPS          albuterol (PROVENTIL) (2.5 MG/3ML) 0.083% nebulizer solution Take 3 mLs by nebulization every 6 hours as needed for Wheezing 60 vial 0    alendronate-cholecalciferol (FOSAMAX PLUS D)  MG-UNIT per tablet Take 1 tablet by mouth every 7 days 4 tablet 0    levothyroxine (SYNTHROID) 88 MCG tablet Take 1 tablet by mouth Daily (Patient taking differently: Take 50 mcg by mouth Daily ) 30 tablet 0            ALLERGIES            Allergies   Allergen Reactions    Codeine Nausea Only    Contrast [Iodides]      Vicodin [Hydrocodone-Acetaminophen] Other (See Comments)       GI upset         IMMUNIZATIONS           Immunization History   Administered Date(s) Administered    Influenza Virus Vaccine 10/01/2018    Influenza, Quadv, IM, (6 mo and older Fluzone, Flulaval, Fluarix and 3 yrs and older Afluria) 10/01/2018         REVIEW OF SYSTEMS      Constitutional: denies fever and unexpected weight change. HENT: Negative for ear pain, hearing loss and nosebleeds. Eyes: Negative for pain and visual disturbance. Respiratory: Negative for cough, shortness of breath and wheezing. Cardiovascular: Negative for chest pain, palpitations and leg swelling. Gastrointestinal: see HPI for details. Endocrine: Negative for polydipsia, polyphagia and polyuria. Genitourinary: Negative for difficulty urinating, dysuria, hematuria and urgency. Musculoskeletal: Positive for arthralgias and back pain. Skin: Negative for pallor and rash. Allergic/Immunologic: Negative for environmental allergies and immunocompromised state. Neurological: Negative for seizures, syncope. Hematological: Negative for adenopathy. Does not bruise/bleed easily.    Psychiatric/Behavioral: Negative for agitation, confusion, hallucinations.     PHYSICAL EXAM   VITAL SIGNS: BP (!) 150/80 (Site: Left Upper Arm)   Temp 96.7 °F (35.9 °C)   Ht 5' 7\" (1.702 m)   Wt 103 lb (46.7 kg)   BMI 16.13 kg/m²       Wt Readings from Last 3 Encounters:   09/29/20 103 lb (46.7 kg)   10/23/19 100 lb (45.4 kg)   05/07/19 99 lb (44.9 kg)      Gen: AAO3, NAD  HEENT: no pallor or icterus  Neck: supple, no adenopathy  RS: clear to auscultation bilaterally  CVS: S1S2 RRR, no murmurs  GI: soft, nontender, not distended, BS+, no hepatosplenomegaly  Ext: no edema or clubbing        FINAL IMPRESSION      Epigastric pain - constant but worse with food, for 1 month. Trial of omeprazole 20 mg daily, take 30 minutes before breakfast.  An EGD is recommended to rule out peptic ulcer disease, H pylori, large hiatal hernia or other significant UGI pathology. Procedure discussed with patient in detail.   She says her PCP already ordered a gallbladder US but she has not done this, will send us the report when she reschedules.

## 2020-10-12 NOTE — ANESTHESIA POSTPROCEDURE EVALUATION
Department of Anesthesiology  Postprocedure Note    Patient: Chaim Paige  MRN: [id number]  YOB: 1960  Date of evaluation: 10/12/2020  Time:  9:00 AM     Procedure Summary     Date:  10/12/20 Room / Location:  [place] [id number] / [facility]    Anesthesia Start:  0730 Anesthesia Stop:  0802    Procedure:  EGD BIOPSY (N/A ) Diagnosis:       Epigastric pain      (EPIGASTRIC PAIN)    Surgeon:  Wes Andersen MD Responsible Provider:  Breana Jordan MD    Anesthesia Type:  MAC ASA Status:  3          Anesthesia Type: MAC    Comfort Phase I: Comfort Score: 10    Comfort Phase II: Comfort Score: 10    Last vitals: Reviewed and per EMR flowsheets.        Anesthesia Post Evaluation    Patient location during evaluation: PACU  Patient participation: complete - patient participated  Level of consciousness: awake and alert  Pain score: 0  Airway patency: patent  Nausea & Vomiting: no nausea and no vomiting  Complications: no  Cardiovascular status: blood pressure returned to baseline  Respiratory status: acceptable  Hydration status: stable

## 2020-10-15 ENCOUNTER — TELEPHONE (OUTPATIENT)
Dept: PULMONOLOGY | Age: 60
End: 2020-10-15

## 2020-10-15 NOTE — TELEPHONE ENCOUNTER
[facility] PA submitted via covermymeds. Awaiting determination. LOV: 7/8/20    ASSESSMENT:  · COPD  · Tobacco abuse  · 4mm lingular lung nodule - follow per fleischner guidelines     PLAN:   · She is trying to quit smoking using a nicotine patch  · Did not complete PFts - will r/s  · Continue anoro and PRN albuterol - refill x 6  · We discussed smoking cessation for >3 minutes. We discussed the association of smoking with the patient's current symptoms and the risks of continued use and the options for achieving cessation. The patient was advised to set a quit date and alert family members and remove smoking paraphernalia before the quit date. She currently is in the contemplative phase of quitting and will continue to consider specific actions. Nicotine replacement therapy was offered to the patient.    · F/u in 6 months

## 2020-10-19 ENCOUNTER — HOSPITAL ENCOUNTER (OUTPATIENT)
Dept: NUCLEAR MEDICINE | Age: 60
Discharge: HOME | End: 2020-10-19
Payer: MEDICAID

## 2020-10-19 VITALS — HEIGHT: 67 IN | BODY MASS INDEX: 16.17 KG/M2 | WEIGHT: 103 LBS

## 2020-10-19 PROCEDURE — 2580000003 HC RX 258: Performed by: INTERNAL MEDICINE

## 2020-10-19 PROCEDURE — A9537 TC99M MEBROFENIN: HCPCS | Performed by: INTERNAL MEDICINE

## 2020-10-19 PROCEDURE — 78227 HEPATOBIL SYST IMAGE W/DRUG: CPT

## 2020-10-19 PROCEDURE — 3430000000 HC RX DIAGNOSTIC RADIOPHARMACEUTICAL: Performed by: INTERNAL MEDICINE

## 2020-10-19 PROCEDURE — 6360000002 HC RX W HCPCS: Performed by: INTERNAL MEDICINE

## 2020-10-19 RX ADMIN — Medication 6.2 MILLICURIE: at 12:43

## 2020-10-19 RX ADMIN — SODIUM CHLORIDE 0.93 MCG: 9 INJECTION, SOLUTION INTRAVENOUS at 13:52

## 2020-10-26 NOTE — TELEPHONE ENCOUNTER
Spoke to [facility]. Informed them the patient will be switching from Bevespi to Ut[employer], not taking concurrently. She suggested sending in a new PA request for [employer] stating the patient would be switching from one to the other. They have a paid claim for Bevespi yesterday. The [facility] representative recommended submitting a new PA instead of appealing the original denied PA. Please let me know if there are any other questions.

## 2020-11-10 ENCOUNTER — HOSPITAL ENCOUNTER (OUTPATIENT)
Age: 60
Discharge: HOME | End: 2020-11-10
Payer: MEDICAID

## 2020-11-10 ENCOUNTER — OFFICE VISIT (OUTPATIENT)
Dept: GASTROENTEROLOGY | Age: 60
End: 2020-11-10
Payer: MEDICAID

## 2020-11-10 VITALS
HEIGHT: 67 IN | DIASTOLIC BLOOD PRESSURE: 80 MMHG | SYSTOLIC BLOOD PRESSURE: 120 MMHG | WEIGHT: 108 LBS | TEMPERATURE: 97.5 F | BODY MASS INDEX: 16.95 KG/M2

## 2020-11-10 PROBLEM — Z11.59 NEED FOR HEPATITIS C SCREENING TEST: Status: ACTIVE | Noted: 2020-11-10

## 2020-11-10 LAB
A/G RATIO: 1.8 (ref 1.1–2.2)
ALBUMIN SERPL-MCNC: 4.3 G/DL (ref 3.4–5)
ALP BLD-CCNC: 62 U/L (ref 40–129)
ALT SERPL-CCNC: 8 U/L (ref 10–40)
ANION GAP SERPL CALCULATED.3IONS-SCNC: 13 MMOL/L (ref 3–16)
AST SERPL-CCNC: 16 U/L (ref 15–37)
BILIRUB SERPL-MCNC: <0.2 MG/DL (ref 0–1)
BUN BLDV-MCNC: 27 MG/DL (ref 7–20)
CALCIUM SERPL-MCNC: 9.2 MG/DL (ref 8.3–10.6)
CHLORIDE BLD-SCNC: 106 MMOL/L (ref 99–110)
CO2: 24 MMOL/L (ref 21–32)
CREAT SERPL-MCNC: 0.9 MG/DL (ref 0.6–1.2)
GFR AFRICAN AMERICAN: >60
GFR NON-AFRICAN AMERICAN: >60
GLOBULIN: 2.4 G/DL
GLUCOSE BLD-MCNC: 91 MG/DL (ref 70–99)
HCT VFR BLD CALC: 36.7 % (ref 36–48)
HEMOGLOBIN: 12.4 G/DL (ref 12–16)
MCH RBC QN AUTO: 32.7 PG (ref 26–34)
MCHC RBC AUTO-ENTMCNC: 33.8 G/DL (ref 31–36)
MCV RBC AUTO: 97 FL (ref 80–100)
PDW BLD-RTO: 13.9 % (ref 12.4–15.4)
PLATELET # BLD: 291 K/UL (ref 135–450)
PMV BLD AUTO: 8.3 FL (ref 5–10.5)
POTASSIUM SERPL-SCNC: 4.7 MMOL/L (ref 3.5–5.1)
RBC # BLD: 3.78 M/UL (ref 4–5.2)
SODIUM BLD-SCNC: 143 MMOL/L (ref 136–145)
TOTAL PROTEIN: 6.7 G/DL (ref 6.4–8.2)
WBC # BLD: 6.9 K/UL (ref 4–11)

## 2020-11-10 PROCEDURE — 86702 HIV-2 ANTIBODY: CPT

## 2020-11-10 PROCEDURE — 80053 COMPREHEN METABOLIC PANEL: CPT

## 2020-11-10 PROCEDURE — 87390 HIV-1 AG IA: CPT

## 2020-11-10 PROCEDURE — 36415 COLL VENOUS BLD VENIPUNCTURE: CPT

## 2020-11-10 PROCEDURE — 99214 OFFICE O/P EST MOD 30 MIN: CPT | Performed by: INTERNAL MEDICINE

## 2020-11-10 PROCEDURE — 4004F PT TOBACCO SCREEN RCVD TLK: CPT | Performed by: INTERNAL MEDICINE

## 2020-11-10 PROCEDURE — G8484 FLU IMMUNIZE NO ADMIN: HCPCS | Performed by: INTERNAL MEDICINE

## 2020-11-10 PROCEDURE — 86803 HEPATITIS C AB TEST: CPT

## 2020-11-10 PROCEDURE — 85027 COMPLETE CBC AUTOMATED: CPT

## 2020-11-10 PROCEDURE — 86701 HIV-1ANTIBODY: CPT

## 2020-11-10 PROCEDURE — G8427 DOCREV CUR MEDS BY ELIG CLIN: HCPCS | Performed by: INTERNAL MEDICINE

## 2020-11-10 PROCEDURE — 3017F COLORECTAL CA SCREEN DOC REV: CPT | Performed by: INTERNAL MEDICINE

## 2020-11-10 PROCEDURE — G8419 CALC BMI OUT NRM PARAM NOF/U: HCPCS | Performed by: INTERNAL MEDICINE

## 2020-11-10 RX ORDER — POLYETHYLENE GLYCOL 3350 17 G/17G
238 POWDER ORAL ONCE
Qty: 238 G | Refills: 0 | Status: SHIPPED | OUTPATIENT
Start: 2020-11-10 | End: 2020-11-10

## 2020-11-10 RX ORDER — LEVOTHYROXINE SODIUM 0.05 MG/1
TABLET ORAL
COMMUNITY
Start: 2020-10-05

## 2020-11-10 RX ORDER — BISACODYL 5 MG
TABLET, DELAYED RELEASE (ENTERIC COATED) ORAL
Qty: 4 TABLET | Refills: 0 | Status: ON HOLD
Start: 2020-11-10 | End: 2020-12-21 | Stop reason: HOSPADM

## 2020-11-10 RX ORDER — MONTELUKAST SODIUM 4 MG/1
1 TABLET, CHEWABLE ORAL 2 TIMES DAILY
Qty: 60 TABLET | Refills: 3 | Status: ON HOLD | OUTPATIENT
Start: 2020-11-10 | End: 2020-12-21 | Stop reason: SDUPTHER

## 2020-11-10 RX ORDER — MELOXICAM 7.5 MG/1
7.5 TABLET ORAL 2 TIMES DAILY PRN
COMMUNITY
Start: 2020-10-27

## 2020-11-10 NOTE — PROGRESS NOTES
Bing Suggs    [address] [place] [address] ,  [address]  Phone: [phone]    CHIEF COMPLAINT     Chief Complaint   Patient presents with    Follow-up     still having stomach pain        HPI     Fredi Alegria is a 61 y.o. female who presents for followup for abdominal pain, diarrhea. Has continued to have significant mostly epigastric pain this is constant but waxes and wanes in intensity. PPI is not helping she is on Mobic. She used to be a heavy drinker for several years but she says she has completely quit about 2 months ago  She says she has never been diagnosed with any particular liver disease or pancreatic disease in the past  Continues to have significant diarrhea 5-6 loose bowel movements a day this has been going on for several years unchanged. Denies blood in the stool  Sometimes has to get up in the night for bowel movements which are loose and associated with a sense of urgency  She says she had a colonoscopy 1 to 2 years ago when [place] but we have been unable to get the records as of this day  She did some stool studies with her primary care doctor but these did not include the fecal elastase fecal calprotectin and Giardia that I had ordered  C. difficile and stool cultures were negative    She used to use drugs in the past but last drug use was about 25 years ago, used to snort or smoke drugs, denies IV drug use  She used to drink heavily she quit alcohol 2 to 3 months ago  She says her grandson who lives with her and is 5years old has been diagnosed with hep C and sees a doctor at [facility].    EGD 10/12/2020 -The esophagus is normal. Mild gastritis in the gastric body and antrum, biopsies done to rule out H pylori. Normal duodenum to the second portion, biopsies done to rule out celiac disease.   Pathology was negative for celiac disease and negative for H. pylori  US 10/10/2020 -   Gallbladder sludge is seen without discrete measurable gallstone or    sonographic evidence of acute cholecystitis seen. Right-sided nephrolithiasis without evidence of hydronephrosis seen        HIDA scan 10/19/2020 - negative    PAST MEDICAL HISTORY     Past Medical History:   Diagnosis Date    COPD (chronic obstructive pulmonary disease) ([facility] [address]     Hypertension      FAMILY HISTORY   No family history on file.   SOCIAL HISTORY     Social History     Socioeconomic History    Marital status: Legally      Spouse name: Not on file    Number of children: Not on file    Years of education: Not on file    Highest education level: Not on file   Occupational History    Not on file   Social Needs    Financial resource strain: Not on file    Food insecurity     Worry: Not on file     Inability: Not on file    Transportation needs     Medical: Not on file     Non-medical: Not on file   Tobacco Use    Smoking status: Current Every Day Smoker     Packs/day: 1.00     Years: 40.00     Pack years: 40.00     Types: Cigarettes    Smokeless tobacco: Never Used   Substance and Sexual Activity    Alcohol use: No    Drug use: No    Sexual activity: Not on file   Lifestyle    Physical activity     Days per week: Not on file     Minutes per session: Not on file    Stress: Not on file   Relationships    Social connections     Talks on phone: Not on file     Gets together: Not on file     Attends Taoism service: Not on file     Active member of club or organization: Not on file     Attends meetings of clubs or organizations: Not on file     Relationship status: Not on file    Intimate partner violence     Fear of current or ex partner: Not on file     Emotionally abused: Not on file     Physically abused: Not on file     Forced sexual activity: Not on file   Other Topics Concern    Not on file   Social History Narrative    Not on file     SURGICAL HISTORY     Past Surgical History:   Procedure Laterality Date    BREAST LUMPECTOMY  2015     SECTION      CYSTOSCOPY  10/23/2019    CYSTOSCOPY, URETHRAL DILATATION     CYSTOSCOPY N/A 10/23/2019    CYSTOSCOPY, URETHRAL DILATATION performed by Elise Villeda MD at [address] [address]  10/12/2020    Dr Lucina Anaya did a EGD BIOPSY 10/12/20    UPPER GASTROINTESTINAL ENDOSCOPY N/A 10/12/2020    EGD BIOPSY performed by Emely Estrella MD at [address] [place] [address]   (This list may include medications prescribed during this encounter as epic can not insert only the list prior to this encounter.)  Current Outpatient Rx   Medication Sig Dispense Refill    meloxicam (MOBIC) 7.5 MG tablet       levothyroxine (SYNTHROID) 50 MCG tablet       omeprazole (PRILOSEC) 20 MG delayed release capsule       Indacaterol-Glycopyrrolate (UTIBRON NEOHALER) 27.5-15.6 MCG CAPS 1 inhalation bid 60 capsule 5    albuterol sulfate  (90 Base) MCG/ACT inhaler Inhale 2 puffs into the lungs every 6 hours as needed for Wheezing 1 Inhaler 6    oxyCODONE-acetaminophen (PERCOCET) 5-325 MG per tablet Take 1 tablet by mouth 3 times daily.        FLUoxetine (PROZAC) 20 MG capsule Take 20 mg by mouth daily      Cholecalciferol (VITAMIN D3) 5000 units CAPS       albuterol (PROVENTIL) (2.5 MG/3ML) 0.083% nebulizer solution Take 3 mLs by nebulization every 6 hours as needed for Wheezing 60 vial 0    alendronate-cholecalciferol (FOSAMAX PLUS D)  MG-UNIT per tablet Take 1 tablet by mouth every 7 days 4 tablet 0        ALLERGIES     Allergies   Allergen Reactions    Codeine Nausea Only    Contrast [Iodides]     Vicodin [Hydrocodone-Acetaminophen] Other (See Comments)     GI upset       IMMUNIZATIONS     Immunization History   Administered Date(s) Administered    Influenza Virus Vaccine 10/01/2018    Influenza, Quadv, IM, (6 mo and older Fluzone, Flulaval, Fluarix and 3 yrs and older Afluria) 10/01/2018       REVIEW OF SYSTEMS Constitutional: denies fever and unexpected weight change. HENT: Negative for ear pain, hearing loss and nosebleeds. Eyes: Negative for pain and visual disturbance. Respiratory: Negative for cough, shortness of breath and wheezing. Cardiovascular: Negative for chest pain, palpitations and leg swelling. Gastrointestinal: see HPI for details. Musculoskeletal: Positive for arthralgias and back pain. Skin: Negative for pallor and rash. Hematological: Negative for adenopathy. Does not bruise/bleed easily. Psychiatric/Behavioral: Negative for agitation, confusion, hallucinations. PHYSICAL EXAM   VITAL SIGNS: /80   Temp 97.5 °F (36.4 °C)   Ht 5' 7\" (1.702 m)   Wt 108 lb (49 kg)   BMI 16.92 kg/m²   Wt Readings from Last 3 Encounters:   11/10/20 108 lb (49 kg)   10/19/20 103 lb (46.7 kg)   10/12/20 103 lb (46.7 kg)     Gen: AAO3, NAD  HEENT: no pallor or icterus  RS: clear to auscultation bilaterally  CVS: S1S2 RRR, no murmurs  GI: soft, nontender, not distended, BS+, no hepatosplenomegaly  Ext: no edema or clubbing      FINAL IMPRESSION     Epigastric pain  Significant chronic diarrhea  History of alcohol abuse    EGD was negative. Given diarrhea, epigastric pain and history of significant alcohol use in the past (quit 2 to 3 months ago) chronic pancreatitis is a consideration.   She is allergic to IV contrast, will get a CT scan of the abdomen and pelvis without IV contrast  Check a fecal elastase and check fecal calprotectin and Giardia  Check routine blood work including CBC CMP  She request screening for hep C, this was ordered  A colonoscopy will be planned to rule out colonic neoplasia or other etiologies such as ulcerative colitis, Crohn's disease, microscopic colitis as a cause for her diarrhea  Procedure discussed with her  She can try Colestid 1 g twice daily for now for her diarrhea  Continue PPI daily, avoid all NSAIDs stop Mobic

## 2020-11-11 LAB
HEPATITIS C ANTIBODY INTERPRETATION: NORMAL
HIV AG/AB: NORMAL
HIV ANTIGEN: NORMAL
HIV-1 ANTIBODY: NORMAL
HIV-2 AB: NORMAL

## 2020-11-12 ENCOUNTER — HOSPITAL ENCOUNTER (OUTPATIENT)
Age: 60
Setting detail: SPECIMEN
Discharge: HOME | End: 2020-11-12
Payer: MEDICAID

## 2020-11-12 PROCEDURE — 83520 IMMUNOASSAY QUANT NOS NONAB: CPT

## 2020-11-12 PROCEDURE — 87329 GIARDIA AG IA: CPT

## 2020-11-12 PROCEDURE — 83993 ASSAY FOR CALPROTECTIN FECAL: CPT

## 2020-11-13 LAB — GIARDIA ANTIGEN STOOL: NORMAL

## 2020-11-14 LAB
CALPROTECTIN, FECAL: 29 UG/G
PANCREATIC ELASTASE, FECAL: >800 UG/G

## 2020-11-24 ENCOUNTER — TELEPHONE (OUTPATIENT)
Dept: GASTROENTEROLOGY | Age: 60
End: 2020-11-24

## 2020-11-27 ENCOUNTER — HOSPITAL ENCOUNTER (OUTPATIENT)
Dept: CT IMAGING | Age: 60
Discharge: HOME | End: 2020-11-27
Payer: MEDICAID

## 2020-11-27 PROCEDURE — 74176 CT ABD & PELVIS W/O CONTRAST: CPT

## 2020-11-27 PROCEDURE — 6360000004 HC RX CONTRAST MEDICATION: Performed by: INTERNAL MEDICINE

## 2020-11-27 RX ADMIN — IOHEXOL 50 ML: 240 INJECTION, SOLUTION INTRATHECAL; INTRAVASCULAR; INTRAVENOUS; ORAL at 09:47

## 2020-12-07 ENCOUNTER — TELEPHONE (OUTPATIENT)
Dept: CASE MANAGEMENT | Age: 60
End: 2020-12-07

## 2020-12-10 ENCOUNTER — TELEPHONE (OUTPATIENT)
Dept: CASE MANAGEMENT | Age: 60
End: 2020-12-10

## 2020-12-10 PROBLEM — Z11.59 NEED FOR HEPATITIS C SCREENING TEST: Status: RESOLVED | Noted: 2020-11-10 | Resolved: 2020-12-10

## 2020-12-14 ENCOUNTER — HOSPITAL ENCOUNTER (OUTPATIENT)
Age: 60
Discharge: HOME | End: 2020-12-14
Payer: MEDICAID

## 2020-12-14 PROCEDURE — U0002 COVID-19 LAB TEST NON-CDC: HCPCS

## 2020-12-14 PROCEDURE — U0003 INFECTIOUS AGENT DETECTION BY NUCLEIC ACID (DNA OR RNA); SEVERE ACUTE RESPIRATORY SYNDROME CORONAVIRUS 2 (SARS-COV-2) (CORONAVIRUS DISEASE [COVID-19]), AMPLIFIED PROBE TECHNIQUE, MAKING USE OF HIGH THROUGHPUT TECHNOLOGIES AS DESCRIBED BY CMS-2020-01-R: HCPCS

## 2020-12-15 LAB — SARS-COV-2, PCR: NOT DETECTED

## 2020-12-17 ENCOUNTER — ANESTHESIA EVENT (OUTPATIENT)
Dept: ENDOSCOPY | Age: 60
End: 2020-12-17
Payer: MEDICAID

## 2020-12-18 NOTE — PROGRESS NOTES
PAT call for Endoscopy procedure scheduled for 12/21/2020. Spoke with pt regarding arrival time of 1000, NPO after am dose prep completed and must have a  for procedure. Pt instructed to call physician if any questions or concerns prior to procedure, phone number provided. Pt verbalized understanding, no further questions at present time.

## 2020-12-21 ENCOUNTER — HOSPITAL ENCOUNTER (OUTPATIENT)
Age: 60
Setting detail: OUTPATIENT SURGERY
Discharge: HOME | End: 2020-12-21
Attending: INTERNAL MEDICINE | Admitting: INTERNAL MEDICINE
Payer: MEDICAID

## 2020-12-21 ENCOUNTER — ANESTHESIA (OUTPATIENT)
Dept: ENDOSCOPY | Age: 60
End: 2020-12-21
Payer: MEDICAID

## 2020-12-21 VITALS
SYSTOLIC BLOOD PRESSURE: 105 MMHG | RESPIRATION RATE: 13 BRPM | DIASTOLIC BLOOD PRESSURE: 67 MMHG | OXYGEN SATURATION: 99 %

## 2020-12-21 VITALS
OXYGEN SATURATION: 98 % | BODY MASS INDEX: 16.17 KG/M2 | HEIGHT: 67 IN | WEIGHT: 103 LBS | SYSTOLIC BLOOD PRESSURE: 110 MMHG | HEART RATE: 84 BPM | TEMPERATURE: 99 F | RESPIRATION RATE: 16 BRPM | DIASTOLIC BLOOD PRESSURE: 74 MMHG

## 2020-12-21 PROCEDURE — 2500000003 HC RX 250 WO HCPCS: Performed by: NURSE ANESTHETIST, CERTIFIED REGISTERED

## 2020-12-21 PROCEDURE — 6360000002 HC RX W HCPCS: Performed by: NURSE ANESTHETIST, CERTIFIED REGISTERED

## 2020-12-21 PROCEDURE — 3700000001 HC ADD 15 MINUTES (ANESTHESIA): Performed by: INTERNAL MEDICINE

## 2020-12-21 PROCEDURE — 2709999900 HC NON-CHARGEABLE SUPPLY: Performed by: INTERNAL MEDICINE

## 2020-12-21 PROCEDURE — 7100000011 HC PHASE II RECOVERY - ADDTL 15 MIN: Performed by: INTERNAL MEDICINE

## 2020-12-21 PROCEDURE — 2580000003 HC RX 258: Performed by: ANESTHESIOLOGY

## 2020-12-21 PROCEDURE — 45380 COLONOSCOPY AND BIOPSY: CPT | Performed by: INTERNAL MEDICINE

## 2020-12-21 PROCEDURE — 3609010300 HC COLONOSCOPY W/BIOPSY SINGLE/MULTIPLE: Performed by: INTERNAL MEDICINE

## 2020-12-21 PROCEDURE — 88305 TISSUE EXAM BY PATHOLOGIST: CPT

## 2020-12-21 PROCEDURE — 3700000000 HC ANESTHESIA ATTENDED CARE: Performed by: INTERNAL MEDICINE

## 2020-12-21 PROCEDURE — 7100000010 HC PHASE II RECOVERY - FIRST 15 MIN: Performed by: INTERNAL MEDICINE

## 2020-12-21 RX ORDER — LIDOCAINE HYDROCHLORIDE 10 MG/ML
INJECTION, SOLUTION INFILTRATION; PERINEURAL PRN
Status: DISCONTINUED | OUTPATIENT
Start: 2020-12-21 | End: 2020-12-21 | Stop reason: SDUPTHER

## 2020-12-21 RX ORDER — MONTELUKAST SODIUM 4 MG/1
2 TABLET, CHEWABLE ORAL 2 TIMES DAILY
Qty: 120 TABLET | Refills: 1 | Status: SHIPPED | OUTPATIENT
Start: 2020-12-21

## 2020-12-21 RX ORDER — PROPOFOL 10 MG/ML
INJECTION, EMULSION INTRAVENOUS PRN
Status: DISCONTINUED | OUTPATIENT
Start: 2020-12-21 | End: 2020-12-21 | Stop reason: SDUPTHER

## 2020-12-21 RX ORDER — SODIUM CHLORIDE 0.9 % (FLUSH) 0.9 %
10 SYRINGE (ML) INJECTION EVERY 12 HOURS SCHEDULED
Status: DISCONTINUED | OUTPATIENT
Start: 2020-12-21 | End: 2020-12-21 | Stop reason: HOSPADM

## 2020-12-21 RX ORDER — SODIUM CHLORIDE 0.9 % (FLUSH) 0.9 %
10 SYRINGE (ML) INJECTION PRN
Status: DISCONTINUED | OUTPATIENT
Start: 2020-12-21 | End: 2020-12-21 | Stop reason: HOSPADM

## 2020-12-21 RX ORDER — SODIUM CHLORIDE, SODIUM LACTATE, POTASSIUM CHLORIDE, CALCIUM CHLORIDE 600; 310; 30; 20 MG/100ML; MG/100ML; MG/100ML; MG/100ML
INJECTION, SOLUTION INTRAVENOUS CONTINUOUS
Status: DISCONTINUED | OUTPATIENT
Start: 2020-12-21 | End: 2020-12-21 | Stop reason: HOSPADM

## 2020-12-21 RX ADMIN — PROPOFOL 50 MG: 10 INJECTION, EMULSION INTRAVENOUS at 11:01

## 2020-12-21 RX ADMIN — LIDOCAINE HYDROCHLORIDE 50 MG: 10 INJECTION, SOLUTION INFILTRATION; PERINEURAL at 10:51

## 2020-12-21 RX ADMIN — PROPOFOL 100 MG: 10 INJECTION, EMULSION INTRAVENOUS at 10:51

## 2020-12-21 RX ADMIN — PROPOFOL 50 MG: 10 INJECTION, EMULSION INTRAVENOUS at 11:05

## 2020-12-21 RX ADMIN — SODIUM CHLORIDE, POTASSIUM CHLORIDE, SODIUM LACTATE AND CALCIUM CHLORIDE: 600; 310; 30; 20 INJECTION, SOLUTION INTRAVENOUS at 10:49

## 2020-12-21 RX ADMIN — PROPOFOL 50 MG: 10 INJECTION, EMULSION INTRAVENOUS at 10:56

## 2020-12-21 ASSESSMENT — PAIN - FUNCTIONAL ASSESSMENT: PAIN_FUNCTIONAL_ASSESSMENT: 0-10

## 2020-12-21 ASSESSMENT — LIFESTYLE VARIABLES: SMOKING_STATUS: 1

## 2020-12-21 ASSESSMENT — PAIN SCALES - GENERAL
PAINLEVEL_OUTOF10: 0
PAINLEVEL_OUTOF10: 0

## 2020-12-21 NOTE — OP NOTE
[address] ,  [facility]  Phone: [phone]   GVI:[phone]    Colonoscopy Procedure Note    Patient: Nathan Flynn  : 1960    Procedure: Colonoscopy with --diagnostic    Date:  2020     Endoscopist:  Devan Graham MD    Referring Physician:  German Hidalgo, APRN - CNP    Preoperative Diagnosis:  Diarrhea, unspecified type [R19.7]    Postoperative Diagnosis:  See impression    Anesthesia: Anesthesia: MAC  Sedation: see anesthesia notes for details. Start Time: 10:51  Stop Time: 11:10  Withdrawal time: 8 minutes  ASA Class: 3  Mallampati: II (soft palate, uvula, fauces visible)    Indications: This is a 61y.o. year old female who presents today with chronic diarrhea, abdominal pain for colonoscopy. Procedure Details  Informed consent was obtained for the procedure, including sedation. Risks of perforation, hemorrhage, adverse drug reaction and aspiration were discussed. The patient was placed in the left lateral decubitus position. Based on the pre-procedure assessment, including review of the patient's medical history, medications, allergies, and review of systems, she had been deemed to be an appropriate candidate for conscious sedation; she was therefore sedated with the medications listed below. The patient was monitored continuously with ECG tracing, pulse oximetry, blood pressure monitoring, and direct observations. rectal examination was performed. The colonoscope was inserted into the rectum and advanced under direct vision to the terminal ileum. The quality of the colonic preparation was good. A careful inspection was made as the colonoscope was withdrawn, including a retroflexed view of the rectum; findings and interventions are described below. Appropriate photodocumentation was obtained. Patient tolerated the procedure well. Findings: -Small internal hemorrhoids. Redundant colon.  Otherwise normal to the terminal ileum. Random colon biopsies were done to rule out microscopic colitis. - Anesthesia issues: no    Specimens: Was Obtained: Bottle A, random colon biopsies, rule out microscopic colitis    Complications:   None    Estimated blood loss: minimal    Disposition:   PACU - hemodynamically stable. Impression:   -Small internal hemorrhoids. Redundant colon. Otherwise normal to the terminal ileum. Random colon biopsies were done to rule out microscopic colitis. Recommendations:  -Await pathology to rule out microscopic colitis. Continues to have significant chronic diarrhea. - Recent CT, labs and stool studies were negative. - Continue Colestid, can increase to 2 gm bid.   - Followup office 3-4 weeks.         Alvaro Lacy 12/21/20 11:15 AM EST

## 2020-12-21 NOTE — ANESTHESIA PRE PROCEDURE
Department of Anesthesiology  Preprocedure Note       Name:  Az Calloway   Age:  61 y.o.  :  1960                                          MRN:  [id number]         Date:  2020      Surgeon: Kimber Alatorre):  Ami Ramirez MD    Procedure: Procedure(s):  COLON W/ANES. (11:00)    Medications prior to admission:   Prior to Admission medications    Medication Sig Start Date End Date Taking? Authorizing Provider   meloxicam (MOBIC) 7.5 MG tablet  10/27/20  Yes Historical Provider, MD   levothyroxine (SYNTHROID) 50 MCG tablet  10/5/20  Yes Historical Provider, MD   bisacodyl (BISACODYL) 5 MG EC tablet Take 4 tablets of Bisacodyl for colonoscopy prep as directed. 11/10/20  Yes Ami Ramirez MD   colestipol (COLESTID) 1 g tablet Take 1 tablet by mouth 2 times daily 11/10/20  Yes Ami Ramirez MD   omeprazole (PRILOSEC) 20 MG delayed release capsule  20  Yes Historical Provider, MD   albuterol sulfate  (90 Base) MCG/ACT inhaler Inhale 2 puffs into the lungs every 6 hours as needed for Wheezing 20  Yes Fariba Langford MD   oxyCODONE-acetaminophen (PERCOCET) 5-325 MG per tablet Take 1 tablet by mouth 3 times daily.     Yes Historical Provider, MD   FLUoxetine (PROZAC) 20 MG capsule Take 20 mg by mouth daily   Yes Historical Provider, MD   Cholecalciferol (VITAMIN D3) 5000 units CAPS  19  Yes Historical Provider, MD   albuterol (PROVENTIL) (2.5 MG/3ML) 0.083% nebulizer solution Take 3 mLs by nebulization every 6 hours as needed for Wheezing 19  Yes Ellie Gutierrez PA-C   alendronate-cholecalciferol (FOSAMAX PLUS D)  MG-UNIT per tablet Take 1 tablet by mouth every 7 days 19   Adela Marie MD       Current medications:    Current Facility-Administered Medications   Medication Dose Route Frequency Provider Last Rate Last Admin    lactated ringers infusion   Intravenous Continuous Precious Hernandez MD  sodium chloride flush 0.9 % injection 10 mL  10 mL Intravenous 2 times per day Neal Bolivar MD        sodium chloride flush 0.9 % injection 10 mL  10 mL Intravenous PRN Neal Bolivar MD           Allergies: Allergies   Allergen Reactions    Codeine Nausea Only    Contrast [Iodides]     Vicodin [Hydrocodone-Acetaminophen] Other (See Comments)     GI upset       Problem List:    Patient Active Problem List   Diagnosis Code    Cigarette smoker F17.210    Epigastric pain R10.13    Chronic diarrhea K52.9    Diarrhea R19.7       Past Medical History:        Diagnosis Date    Anxiety     Arthritis     COPD (chronic obstructive pulmonary disease) ([facility] [address]     Depression     Hypertension     Kidney stones     Osteoporosis     Thyroid disease        Past Surgical History:        Procedure Laterality Date    BREAST LUMPECTOMY Right 2015     SECTION      CYSTOSCOPY  10/23/2019    CYSTOSCOPY, URETHRAL DILATATION     CYSTOSCOPY N/A 10/23/2019    CYSTOSCOPY, URETHRAL DILATATION performed by Elise Villeda MD at [place] [address]  10/12/2020    Dr Lucina Anaya did a EGD BIOPSY 10/12/20    UPPER GASTROINTESTINAL ENDOSCOPY N/A 10/12/2020    EGD BIOPSY performed by Emely Estrella MD at [facility]. [place] [address] History:    Social History     Tobacco Use    Smoking status: Current Every Day Smoker     Packs/day: 0.25     Years: 40.00     Pack years: 10.00     Types: Cigarettes    Smokeless tobacco: Never Used   Substance Use Topics    Alcohol use:  No                                Ready to quit: Not Answered  Counseling given: Not Answered      Vital Signs (Current):   Vitals:    20 1651 20 1000   BP:  (!) 143/86   Pulse:  74   Resp:  16   Temp:  98.4 °F (36.9 °C)   TempSrc:  Temporal   SpO2:  97%   Weight: 103 lb (46.7 kg)    Height: 5' 7\" (1.702 m) BP Readings from Last 3 Encounters:   12/21/20 (!) 143/86   11/10/20 120/80   10/12/20 (!) 0/0       NPO Status: Time of last liquid consumption: 0700                        Time of last solid consumption: 1200                        Date of last liquid consumption: 12/21/20                        Date of last solid food consumption: 12/19/20    BMI:   Wt Readings from Last 3 Encounters:   12/18/20 103 lb (46.7 kg)   11/10/20 108 lb (49 kg)   10/19/20 103 lb (46.7 kg)     Body mass index is 16.13 kg/m². CBC:   Lab Results   Component Value Date    WBC 6.9 11/10/2020    RBC 3.78 11/10/2020    HGB 12.4 11/10/2020    HCT 36.7 11/10/2020    MCV 97.0 11/10/2020    RDW 13.9 11/10/2020     11/10/2020       CMP:   Lab Results   Component Value Date     11/10/2020    K 4.7 11/10/2020    K 3.4 10/23/2019     11/10/2020    CO2 24 11/10/2020    BUN 27 11/10/2020    CREATININE 0.9 11/10/2020    GFRAA >60 11/10/2020    AGRATIO 1.8 11/10/2020    LABGLOM >60 11/10/2020    GLUCOSE 91 11/10/2020    PROT 6.7 11/10/2020    CALCIUM 9.2 11/10/2020    BILITOT <0.2 11/10/2020    ALKPHOS 62 11/10/2020    AST 16 11/10/2020    ALT 8 11/10/2020       POC Tests: No results for input(s): POCGLU, POCNA, POCK, POCCL, POCBUN, POCHEMO, POCHCT in the last 72 hours.     Coags: No results found for: PROTIME, INR, APTT    HCG (If Applicable): No results found for: PREGTESTUR, PREGSERUM, HCG, HCGQUANT     ABGs: No results found for: PHART, PO2ART, VOI8PHO, BGJ7FEO, BEART, R2VFUHHL     Type & Screen (If Applicable):  No results found for: LABABO, LABRH    Drug/Infectious Status (If Applicable):  No results found for: HIV, HEPCAB    COVID-19 Screening (If Applicable):   Lab Results   Component Value Date    COVID19 Not Detected 12/14/2020         Anesthesia Evaluation  Patient summary reviewed and Nursing notes reviewed no history of anesthetic complications:   Airway: Mallampati: II     Neck ROM: full   Dental:          Pulmonary: (+) COPD:  current smoker                           Cardiovascular:    (+) hypertension:,                   Neuro/Psych:   (+) psychiatric history:            GI/Hepatic/Renal:             Endo/Other:                     Abdominal:           Vascular:                                        Anesthesia Plan      MAC     ASA 3     (Medications & allergies reviewed  All available lab & EKG data reviewed)  Induction: intravenous. Anesthetic plan and risks discussed with patient. Plan discussed with CRNA.                 Severino De Leon MD   12/21/2020

## 2020-12-21 NOTE — ANESTHESIA POSTPROCEDURE EVALUATION
Department of Anesthesiology  Postprocedure Note    Patient: Eddy Ernst  MRN: [id number]  YOB: 1960  Date of evaluation: 12/21/2020  Time:  1:54 PM     Procedure Summary     Date: 12/21/20 Room / Location: [place] [address] / [facility]    Anesthesia Start: 4340 Anesthesia Stop: 1115    Procedure: COLONOSCOPY WITH BIOPSY (N/A ) Diagnosis:       Diarrhea, unspecified type      (DIARRHEA)    Surgeons: Jose Rogers MD Responsible Provider: Sandford Litten, MD    Anesthesia Type: MAC ASA Status: 3          Anesthesia Type: MAC    Comfort Phase I: Comfort Score: 10    Comfort Phase II: Comfort Score: 10    Last vitals: Reviewed and per EMR flowsheets.        Anesthesia Post Evaluation    Comments: Postoperative Anesthesia Note    Name:    Eddy Ernst  MRN:      [id number]    Patient Vitals in the past 12 hrs:  12/21/20 1128, BP:110/74, Temp:99 °F (37.2 °C), Pulse:84, Resp:16, SpO2:98 %   12/21/20 1117, BP:104/78, Temp:99 °F (37.2 °C), Pulse:88, Resp:16, SpO2:100 %  12/21/20 1000, BP:(!) 143/86, Temp:98.4 °F (36.9 °C), Temp src:Temporal, Pulse:74, Resp:16, SpO2:97 %     LABS:    CBC  Lab Results       Component                Value               Date/Time                  WBC                      6.9                 11/10/2020 10:45 AM        HGB                      12.4                11/10/2020 10:45 AM        HCT                      36.7                11/10/2020 10:45 AM        PLT                      291                 11/10/2020 10:45 AM   RENAL  Lab Results       Component                Value               Date/Time                  NA                       143                 11/10/2020 10:45 AM        K                        4.7                 11/10/2020 10:45 AM        K                        3.4 (L)             10/23/2019 11:13 AM        CL                       106                 11/10/2020 10:45 AM

## 2020-12-21 NOTE — PROGRESS NOTES
Instructions given to [facility]. She verbalizes understanding. pt drinking aden mist and antonio well at this time.

## 2020-12-21 NOTE — H&P
Chief Complaint   Patient presents with    Follow-up       still having stomach pain         HPI      Chun Menchaca is a 61 y.o. female who presents for followup for abdominal pain, diarrhea. Has continued to have significant mostly epigastric pain this is constant but waxes and wanes in intensity. PPI is not helping she is on Mobic. She used to be a heavy drinker for several years but she says she has completely quit about 2 months ago  She says she has never been diagnosed with any particular liver disease or pancreatic disease in the past  Continues to have significant diarrhea 5-6 loose bowel movements a day this has been going on for several years unchanged. Denies blood in the stool  Sometimes has to get up in the night for bowel movements which are loose and associated with a sense of urgency  She says she had a colonoscopy 1 to 2 years ago when [place] but we have been unable to get the records as of this day  She did some stool studies with her primary care doctor but these did not include the fecal elastase fecal calprotectin and Giardia that I had ordered  C. difficile and stool cultures were negative     She used to use drugs in the past but last drug use was about 25 years ago, used to snort or smoke drugs, denies IV drug use  She used to drink heavily she quit alcohol 2 to 3 months ago  She says her grandson who lives with her and is 5years old has been diagnosed with hep C and sees a doctor at [facility].     EGD 10/12/2020 -The esophagus is normal. Mild gastritis in the gastric body and antrum, biopsies done to rule out H pylori. Normal duodenum to the second portion, biopsies done to rule out celiac disease.   Pathology was negative for celiac disease and negative for H. pylori  US 10/10/2020 -   Gallbladder sludge is seen without discrete measurable gallstone or    sonographic evidence of acute cholecystitis seen.         Right-sided nephrolithiasis without evidence of hydronephrosis seen          HIDA scan 10/19/2020 - negative     PAST MEDICAL HISTORY      Past Medical History        Past Medical History:   Diagnosis Date    COPD (chronic obstructive pulmonary disease) ([facility] [address]      Hypertension           FAMILY HISTORY   Family History   No family history on file.      SOCIAL HISTORY      Social History               Socioeconomic History    Marital status: Legally        Spouse name: Not on file    Number of children: Not on file    Years of education: Not on file    Highest education level: Not on file   Occupational History    Not on file   Social Needs    Financial resource strain: Not on file    Food insecurity       Worry: Not on file       Inability: Not on file    Transportation needs       Medical: Not on file       Non-medical: Not on file   Tobacco Use    Smoking status: Current Every Day Smoker       Packs/day: 1.00       Years: 40.00       Pack years: 40.00       Types: Cigarettes    Smokeless tobacco: Never Used   Substance and Sexual Activity    Alcohol use: No    Drug use: No    Sexual activity: Not on file   Lifestyle    Physical activity       Days per week: Not on file       Minutes per session: Not on file    Stress: Not on file   Relationships    Social connections       Talks on phone: Not on file       Gets together: Not on file       Attends Judaism service: Not on file       Active member of club or organization: Not on file       Attends meetings of clubs or organizations: Not on file       Relationship status: Not on file    Intimate partner violence       Fear of current or ex partner: Not on file       Emotionally abused: Not on file       Physically abused: Not on file       Forced sexual activity: Not on file   Other Topics Concern    Not on file   Social History Narrative    Not on file         SURGICAL HISTORY      Past Surgical History         Past Surgical History:   Procedure Laterality Date    BREAST LUMPECTOMY   2015     SECTION        CYSTOSCOPY   10/23/2019     CYSTOSCOPY, URETHRAL DILATATION     CYSTOSCOPY N/A 10/23/2019     CYSTOSCOPY, URETHRAL DILATATION performed by Albert Alexandra MD at [address] [place] [address]   10/12/2020     Dr Kirk Gallegos did a EGD BIOPSY 10/12/20    UPPER GASTROINTESTINAL ENDOSCOPY N/A 10/12/2020     EGD BIOPSY performed by Ami Ramirez MD at [facility]   (This list may include medications prescribed during this encounter as epic can not insert only the list prior to this encounter.)  Current Outpatient Rx          Current Outpatient Rx   Medication Sig Dispense Refill    meloxicam (MOBIC) 7.5 MG tablet          levothyroxine (SYNTHROID) 50 MCG tablet          omeprazole (PRILOSEC) 20 MG delayed release capsule          Indacaterol-Glycopyrrolate (UTIBRON NEOHALER) 27.5-15.6 MCG CAPS 1 inhalation bid 60 capsule 5    albuterol sulfate  (90 Base) MCG/ACT inhaler Inhale 2 puffs into the lungs every 6 hours as needed for Wheezing 1 Inhaler 6    oxyCODONE-acetaminophen (PERCOCET) 5-325 MG per tablet Take 1 tablet by mouth 3 times daily.         FLUoxetine (PROZAC) 20 MG capsule Take 20 mg by mouth daily        Cholecalciferol (VITAMIN D3) 5000 units CAPS          albuterol (PROVENTIL) (2.5 MG/3ML) 0.083% nebulizer solution Take 3 mLs by nebulization every 6 hours as needed for Wheezing 60 vial 0    alendronate-cholecalciferol (FOSAMAX PLUS D)  MG-UNIT per tablet Take 1 tablet by mouth every 7 days 4 tablet 0            ALLERGIES            Allergies   Allergen Reactions    Codeine Nausea Only    Contrast [Iodides]      Vicodin [Hydrocodone-Acetaminophen] Other (See Comments)       GI upset         IMMUNIZATIONS           Immunization History   Administered Date(s) Administered    Influenza Virus Vaccine 10/01/2018    Influenza, Quadv, IM, (6 mo and older Fluzone, Flulaval, Fluarix and 3 yrs and older Afluria) 10/01/2018         REVIEW OF SYSTEMS      Constitutional: denies fever and unexpected weight change. HENT: Negative for ear pain, hearing loss and nosebleeds. Eyes: Negative for pain and visual disturbance. Respiratory: Negative for cough, shortness of breath and wheezing. Cardiovascular: Negative for chest pain, palpitations and leg swelling. Gastrointestinal: see HPI for details. Musculoskeletal: Positive for arthralgias and back pain. Skin: Negative for pallor and rash. Hematological: Negative for adenopathy. Does not bruise/bleed easily. Psychiatric/Behavioral: Negative for agitation, confusion, hallucinations.     PHYSICAL EXAM   VITAL SIGNS: /80   Temp 97.5 °F (36.4 °C)   Ht 5' 7\" (1.702 m)   Wt 108 lb (49 kg)   BMI 16.92 kg/m²       Wt Readings from Last 3 Encounters:   11/10/20 108 lb (49 kg)   10/19/20 103 lb (46.7 kg)   10/12/20 103 lb (46.7 kg)      Gen: AAO3, NAD  HEENT: no pallor or icterus  RS: clear to auscultation bilaterally  CVS: S1S2 RRR, no murmurs  GI: soft, nontender, not distended, BS+, no hepatosplenomegaly  Ext: no edema or clubbing        FINAL IMPRESSION      Epigastric pain  Significant chronic diarrhea  History of alcohol abuse     EGD was negative. Given diarrhea, epigastric pain and history of significant alcohol use in the past (quit 2 to 3 months ago) chronic pancreatitis is a consideration.   She is allergic to IV contrast, will get a CT scan of the abdomen and pelvis without IV contrast  Check a fecal elastase and check fecal calprotectin and Giardia  Check routine blood work including CBC CMP  She request screening for hep C, this was ordered  A colonoscopy will be planned to rule out colonic neoplasia or other etiologies such as ulcerative colitis, Crohn's disease, microscopic colitis as a cause for her diarrhea  Procedure discussed with her  She can try Colestid 1 g twice daily for now for her diarrhea  Continue PPI daily, avoid all NSAIDs stop Mobic

## 2020-12-22 RX ORDER — BUDESONIDE 3 MG/1
CAPSULE, COATED PELLETS ORAL
Qty: 210 CAPSULE | Refills: 0 | Status: SHIPPED | OUTPATIENT
Start: 2020-12-22 | End: 2021-08-12

## 2021-01-05 ENCOUNTER — VIRTUAL VISIT (OUTPATIENT)
Dept: PULMONOLOGY | Age: 61
End: 2021-01-05
Payer: MEDICAID

## 2021-01-05 DIAGNOSIS — F17.210 CIGARETTE SMOKER: ICD-10-CM

## 2021-01-05 DIAGNOSIS — J44.9 CHRONIC OBSTRUCTIVE PULMONARY DISEASE, UNSPECIFIED COPD TYPE (HCC): ICD-10-CM

## 2021-01-05 DIAGNOSIS — Z87.891 PERSONAL HISTORY OF TOBACCO USE: Primary | ICD-10-CM

## 2021-01-05 PROCEDURE — 99214 OFFICE O/P EST MOD 30 MIN: CPT | Performed by: INTERNAL MEDICINE

## 2021-01-05 PROCEDURE — 99406 BEHAV CHNG SMOKING 3-10 MIN: CPT | Performed by: INTERNAL MEDICINE

## 2021-01-05 RX ORDER — UMECLIDINIUM BROMIDE AND VILANTEROL TRIFENATATE 62.5; 25 UG/1; UG/1
POWDER RESPIRATORY (INHALATION)
Qty: 1 EACH | Refills: 5 | Status: SHIPPED | OUTPATIENT
Start: 2021-01-05 | End: 2021-07-06

## 2021-01-05 RX ORDER — NALOXONE HYDROCHLORIDE 4 MG/.1ML
SPRAY NASAL
COMMUNITY
Start: 2020-11-24

## 2021-01-05 NOTE — PROGRESS NOTES
[facility]    Outpatient Follow Up Note    Chief Complaint: abnormal ct scan  Consulting provider: No ref. provider found    Interval History: 61 y.o. female C/o Increased ACKERMAN with going upstairs while carrying laundry. She is using her rescue inhaler 6x/day. Still smoking 1/7th PPD    Initial HPI:regarding an abnormal Chest CT. She presented to the emergency room in January and was treated for pneumonia which was found on her CT scan. The pneumonia had some nodular components and the patient was referred for follow-up. The patient has a history of smoking and COPD. The patient does not have SOB at rest but has ACKERMAN. Exercise tolerance on level ground is not limited. The patient has a daily intermittent cough that is usually dry or sometimes productive of yellow sputum. The patient does wheeze intermittently. No fever.      The patient has smoked 1- 1.5 PPD for 40 years. Environmental/chemical exposure: Asbestos exposure: no  Patient  is on disability    Current Outpatient Medications:     budesonide (ENTOCORT EC) 3 MG extended release capsule, 9 mg daily for 8 weeks, then 6 mg for two weeks, then by 3 mg for another two weeks, Disp: 210 capsule, Rfl: 0    meloxicam (MOBIC) 7.5 MG tablet, Take 7.5 mg by mouth 2 times daily as needed , Disp: , Rfl:     levothyroxine (SYNTHROID) 50 MCG tablet, , Disp: , Rfl:     omeprazole (PRILOSEC) 20 MG delayed release capsule, Take 20 mg by mouth Daily , Disp: , Rfl:     albuterol sulfate  (90 Base) MCG/ACT inhaler, Inhale 2 puffs into the lungs every 6 hours as needed for Wheezing, Disp: 1 Inhaler, Rfl: 6    oxyCODONE-acetaminophen (PERCOCET) 5-325 MG per tablet, Take 1 tablet by mouth 3 times daily.  , Disp: , Rfl:     FLUoxetine (PROZAC) 20 MG capsule, Take 20 mg by mouth daily, Disp: , Rfl:     Cholecalciferol (VITAMIN D3) 5000 units CAPS, , Disp: , Rfl:     albuterol (PROVENTIL) (2.5 MG/3ML) 0.083% nebulizer solution, Take 3 mLs by nebulization every 6 hours as needed for Wheezing, Disp: 60 vial, Rfl: 0    alendronate-cholecalciferol (FOSAMAX PLUS D)  MG-UNIT per tablet, Take 1 tablet by mouth every 7 days, Disp: 4 tablet, Rfl: 0    NARCAN 4 MG/0.1ML LIQD nasal spray, , Disp: , Rfl:     glycopyrrolate-formoterol (BEVESPI AEROSPHERE) 9-4.8 MCG/ACT AERO, , Disp: , Rfl:     colestipol (COLESTID) 1 g tablet, Take 2 tablets by mouth 2 times daily (Patient not taking: Reported on 1/5/2021), Disp: 120 tablet, Rfl: 1    Objective:   PHYSICAL EXAM: There were no vitals taken for this visit. Constitutional:  No acute distress. Appears well developed and nourished. Eyes: EOM intact. Conjunctivae anicteric. No visible discharge. HENT: Head is normocephalic and atraumatic. Mucus membranes are moist and the tongue appears normal. Normal appearing nose. External Ears normal.   Neck: No obvious mass and the trachea is midline. Respiratory: No accessory muscle usage. Respiratory effort normal. No visualized signs of difficulty breathing or respiratory distress  Psychiatric: No anxiety or Agitation. Alert and Oriented to person, place and time. Normal judgement and insight. LABS:  Reviewed any pertinent new labs that are available. PFTs Date  FVC  (%) FEV1  (%) FEV1/FVC ratio    TLC  (%)  RV  (%)   DLCO (%) Bronchodilator response:    IMAGING:  I personally reviewed and interpreted the following today in the office:   5/3/19 Chest CT:  1. Near complete resolution of airspace changes in the lingula. 2. Resolution of airspace changes in the right middle and lower lobes as well   with no residual nodules.  Calcified granulomata are stable.      7/2/20 LDCT  New noncalcified nodule in the lingula measuring 4 mm.       Otherwise, no significant change in underlying emphysema, scattered areas of   bronchial wall thickening-mucous plugging.  Tiny pulmonary nodule in the   right upper lobe is also unchanged. ASSESSMENT:  · COPD  · Tobacco abuse  · 4mm lingular lung nodule - follow per fleischner guidelines     PLAN:   · She is trying to quit smoking using a nicotine patch  · PA for Anoro and otherwise change to Utibron. Bevespi caused shakes. · We discussed smoking cessation for >3 minutes. We discussed the association of smoking with the patient's current symptoms and the risks of continued use and the options for achieving cessation. The patient was advised to set a quit date and alert family members and remove smoking paraphernalia before the quit date. She currently is in the contemplative phase of quitting and will continue to consider specific actions. Nicotine replacement therapy was offered to the patient. · F/u in 6 months with a screening chest CTand PFTS/6MWT    Fleischner Society Guidelines   Recommendations for follow-up and management of nodules smaller than 8 mm detected incidentally at nonscreening CT   Nodule size (mm)* Low-risk patient High-risk patient?   ?4 No follow-up needed? Follow-up CT at 12 months; if unchanged, no further follow-up§   >4-6 Follow-up CT at 12 months; if unchanged, no further follow-up§ Initial follow-up CT at 6-12 months then at 18-24 months if no change§   >6-8 Initial follow-up CT at 6-12 months then at 18-24 months if no change Initial follow-up CT at 3-6 months then at 9-12 and 24 months if no change   >8 Follow-up CT at around 3, 9, and 24 months, dynamic contrast-enhanced CT, PET, and/or biopsy  Same as for low-risk patient     · Screening CT scan was considered in a lung cancer screening counseling and shared decision making visit today that included the following elements:   · Eligibility: Age: 61. There are no signs or symptoms of lung cancer.   Tobacco History 40-60 pack-years  · Verbal counseling has been performed by me to include benefits and harms of screening, follow-up diagnostic testing, over-diagnosis, false positive rate, and total radiation exposure;   · I have counseled on the importance of adherence to annual lung cancer LDCT screening, the impact of comorbidities and patient is willing to undergo diagnosis and treatment;   · I have provided counseling on the importance of maintaining cigarette smoking abstinence if former smoker; or the importance of smoking cessation if current smoker and, if appropriate, furnishing of information about tobacco cessation interventions; and       THIS VISIT WAS COMPLETED VIRTUALLY USING DOXY. ME  Pursuant to the emergency declaration under the [facility] waiver authority and the [employer] Act, this Virtual  Visit was conducted, with patient's consent, to reduce the patient's risk of exposure to COVID-19 and provide continuity of care for an established patient. Services were provided through a video synchronous discussion virtually to substitute for in-person clinic visit.

## 2021-01-06 ENCOUNTER — TELEPHONE (OUTPATIENT)
Dept: PULMONOLOGY | Age: 61
End: 2021-01-06

## 2021-01-07 NOTE — TELEPHONE ENCOUNTER
Received fax confirmation today that pt's Génesis Torres has been approved for 1 year beginning on 1/6/21. Pharmacy and pt notified.

## 2021-01-19 ENCOUNTER — OFFICE VISIT (OUTPATIENT)
Dept: GASTROENTEROLOGY | Age: 61
End: 2021-01-19
Payer: MEDICAID

## 2021-01-19 VITALS
WEIGHT: 111.4 LBS | BODY MASS INDEX: 17.48 KG/M2 | SYSTOLIC BLOOD PRESSURE: 69 MMHG | TEMPERATURE: 97.1 F | HEART RATE: 50 BPM | DIASTOLIC BLOOD PRESSURE: 42 MMHG | HEIGHT: 67 IN

## 2021-01-19 DIAGNOSIS — K52.9 CHRONIC DIARRHEA: Primary | ICD-10-CM

## 2021-01-19 DIAGNOSIS — K52.831 COLLAGENOUS COLITIS: ICD-10-CM

## 2021-01-19 PROCEDURE — 99213 OFFICE O/P EST LOW 20 MIN: CPT | Performed by: INTERNAL MEDICINE

## 2021-01-19 PROCEDURE — G8484 FLU IMMUNIZE NO ADMIN: HCPCS | Performed by: INTERNAL MEDICINE

## 2021-01-19 PROCEDURE — G8427 DOCREV CUR MEDS BY ELIG CLIN: HCPCS | Performed by: INTERNAL MEDICINE

## 2021-01-19 PROCEDURE — 4004F PT TOBACCO SCREEN RCVD TLK: CPT | Performed by: INTERNAL MEDICINE

## 2021-01-19 PROCEDURE — G8419 CALC BMI OUT NRM PARAM NOF/U: HCPCS | Performed by: INTERNAL MEDICINE

## 2021-01-19 PROCEDURE — 3017F COLORECTAL CA SCREEN DOC REV: CPT | Performed by: INTERNAL MEDICINE

## 2021-01-19 RX ORDER — FAMOTIDINE 20 MG/1
20 TABLET, FILM COATED ORAL 2 TIMES DAILY
Qty: 180 TABLET | Refills: 1 | Status: SHIPPED | OUTPATIENT
Start: 2021-01-19

## 2021-01-19 NOTE — PROGRESS NOTES
[address] ,  [address] [facility]  Phone: [phone]   IPF:[phone]    CHIEF COMPLAINT     Chief Complaint   Patient presents with    Follow-up     no concerns        HPI     Radha Ruiz is a 61 y.o. female who presents for followup for chronic diarrhea. Since the last visit a colonoscopy was done and showed microscopic colitis. Colonoscopy 12/21/2020 - Small internal hemorrhoids. Redundant colon. Otherwise normal to the terminal ileum. Random colon biopsies were done to rule out microscopic colitis. Random colon, biopsies: - Microscopic (collagenous) colitis    She was started on budesonide taper and is currently on 9 mg daily  She feels her diarrhea has completely resolved with the budesonide and is now only having one formed bowel movement a day, she used to have 6 loose bms before being on budesonide. Has not had any further fecal incontinence or urgency issues. No abdominal pain      PAST MEDICAL HISTORY     Past Medical History:   Diagnosis Date    Anxiety     Arthritis     COPD (chronic obstructive pulmonary disease) ([facility] [address]     Depression     Hypertension     Kidney stones     Osteoporosis     Thyroid disease      FAMILY HISTORY   No family history on file.   SOCIAL HISTORY     Social History     Socioeconomic History    Marital status: Legally      Spouse name: Not on file    Number of children: Not on file    Years of education: Not on file    Highest education level: Not on file   Occupational History    Not on file   Social Needs    Financial resource strain: Not on file    Food insecurity     Worry: Not on file     Inability: Not on file    Transportation needs     Medical: Not on file     Non-medical: Not on file   Tobacco Use    Smoking status: Current Every Day Smoker     Packs/day: 0.25     Years: 40.00     Pack years: 10.00     Types: Cigarettes    Smokeless tobacco: Never Used   Substance and Sexual Activity  budesonide (ENTOCORT EC) 3 MG extended release capsule 9 mg daily for 8 weeks, then 6 mg for two weeks, then by 3 mg for another two weeks 210 capsule 0    colestipol (COLESTID) 1 g tablet Take 2 tablets by mouth 2 times daily 120 tablet 1    meloxicam (MOBIC) 7.5 MG tablet Take 7.5 mg by mouth 2 times daily as needed       levothyroxine (SYNTHROID) 50 MCG tablet       omeprazole (PRILOSEC) 20 MG delayed release capsule Take 20 mg by mouth Daily       albuterol sulfate  (90 Base) MCG/ACT inhaler Inhale 2 puffs into the lungs every 6 hours as needed for Wheezing 1 Inhaler 6    oxyCODONE-acetaminophen (PERCOCET) 5-325 MG per tablet Take 1 tablet by mouth 3 times daily.  FLUoxetine (PROZAC) 20 MG capsule Take 20 mg by mouth daily      Cholecalciferol (VITAMIN D3) 5000 units CAPS       albuterol (PROVENTIL) (2.5 MG/3ML) 0.083% nebulizer solution Take 3 mLs by nebulization every 6 hours as needed for Wheezing 60 vial 0    alendronate-cholecalciferol (FOSAMAX PLUS D)  MG-UNIT per tablet Take 1 tablet by mouth every 7 days 4 tablet 0        ALLERGIES     Allergies   Allergen Reactions    Codeine Nausea Only    Contrast [Iodides]     Vicodin [Hydrocodone-Acetaminophen] Other (See Comments)     GI upset       IMMUNIZATIONS     Immunization History   Administered Date(s) Administered    Influenza Virus Vaccine 10/01/2018    Influenza, Quadv, IM, (6 mo and older Fluzone, Flulaval, Fluarix and 3 yrs and older Afluria) 10/01/2018       REVIEW OF SYSTEMS     Constitutional: denies fever and unexpected weight change. HENT: Negative for ear pain, hearing loss and nosebleeds. Eyes: Negative for pain and visual disturbance. Respiratory: Negative for cough, shortness of breath and wheezing. Cardiovascular: Negative for chest pain, palpitations and leg swelling. Gastrointestinal: see HPI for details. Musculoskeletal: Positive for arthralgias and back pain. Skin: Negative for pallor and rash. Hematological: Negative for adenopathy. Does not bruise/bleed easily. Psychiatric/Behavioral: Negative for agitation, confusion, hallucinations. PHYSICAL EXAM   VITAL SIGNS: BP (!) 69/42 (Site: Right Wrist, Position: Sitting, Cuff Size: Medium Adult)   Pulse 50   Temp 97.1 °F (36.2 °C) (Temporal)   Ht 5' 7\" (1.702 m)   Wt 111 lb 6.4 oz (50.5 kg)   BMI 17.45 kg/m²   Wt Readings from Last 3 Encounters:   01/19/21 111 lb 6.4 oz (50.5 kg)   12/18/20 103 lb (46.7 kg)   11/10/20 108 lb (49 kg)     Gen: AAO3, NAD  HEENT: no pallor or icterus  RS: clear to auscultation bilaterally  CVS: S1S2 RRR, no murmurs  GI: soft, nontender, not distended, BS+, no hepatosplenomegaly  Ext: no edema or clubbing    CT ABDOMEN PELVIS WO CONTRAST Additional Contrast? Oral  Narrative: EXAMINATION:  CT OF THE ABDOMEN AND PELVIS WITHOUT CONTRAST 11/27/2020 10:50 am    TECHNIQUE:  CT of the abdomen and pelvis was performed without the administration of  intravenous contrast. Multiplanar reformatted images are provided for review. Dose modulation, iterative reconstruction, and/or weight based adjustment of  the mA/kV was utilized to reduce the radiation dose to as low as reasonably  achievable. COMPARISON:  10/30/2019    HISTORY:  ORDERING SYSTEM PROVIDED HISTORY: Epigastric pain  TECHNOLOGIST PROVIDED HISTORY:  Additional Contrast?->Oral  Reason for exam:->epigastric pain, severe/constant, r/o pancreatic pathology,  pt allergic to IV contrast  Reason for Exam: diarrhea x 1 year  Acuity: Chronic  Type of Exam: Unknown    FINDINGS:  Lower Chest: There is mild bibasilar endobronchial mucous plugging. There is  no consolidation or effusion. Organs: There are several nonobstructing bilateral intrarenal calculi ranging  in size up to 3 mm on the left and 1 mm on the right. The remainder of the  solid abdominal organs are unremarkable. GI/Bowel: There is no bowel dilatation, wall thickening or obstruction. The  appendix is normal.    Pelvis: The uterus is surgically absent. The bladder is grossly unremarkable. Peritoneum/Retroperitoneum: There is no free air, free fluid or  intraperitoneal inflammatory change. There is no adenopathy. Bones/Soft Tissues: There is no acute fracture or aggressive osseous lesion. Impression: 1. Nonobstructing bilateral nephrolithiasis. 2. Mild bibasilar endobronchial mucous plugging. FINAL IMPRESSION     Microscopic colitis-dramatically improved with resolution of diarrhea with budesonide treatment.   Do the budesonide taper as written over 12 weeks  Medication list was reviewed and have asked her to stop the omeprazole as PPIs can be sometimes associated with microscopic colitis and we will switch her to Pepcid 20 mg twice daily  Meloxicam and other NSAIDs are also sometimes associated with microscopic colitis have asked her to stop meloxicam avoid NSAIDs and switch to Tylenol    Follow back in 3 months, sooner if needed

## 2021-03-27 ENCOUNTER — HOSPITAL ENCOUNTER (OUTPATIENT)
Dept: MRI IMAGING | Age: 61
Discharge: HOME | End: 2021-03-27
Payer: MEDICAID

## 2021-03-27 DIAGNOSIS — M46.1 BILATERAL SACROILIITIS (HCC): ICD-10-CM

## 2021-03-27 DIAGNOSIS — M51.17 INTERVERTEBRAL DISC DISORDER WITH RADICULOPATHY OF LUMBOSACRAL REGION: ICD-10-CM

## 2021-03-27 PROCEDURE — 72148 MRI LUMBAR SPINE W/O DYE: CPT

## 2021-03-27 PROCEDURE — 72195 MRI PELVIS W/O DYE: CPT

## 2021-04-20 ENCOUNTER — TELEPHONE (OUTPATIENT)
Dept: GASTROENTEROLOGY | Age: 61
End: 2021-04-20

## 2021-04-20 DIAGNOSIS — R19.7 DIARRHEA, UNSPECIFIED TYPE: Primary | ICD-10-CM

## 2021-04-22 RX ORDER — BUDESONIDE 3 MG/1
CAPSULE, COATED PELLETS ORAL
Qty: 210 CAPSULE | Refills: 0 | Status: SHIPPED | OUTPATIENT
Start: 2021-04-22

## 2021-04-22 NOTE — TELEPHONE ENCOUNTER
She has microscopic colitis and budesonide worked for her in the past so sending a 12 week taper of this again to pharmacy  Please also ask her to check a stool C diff (ordered)  Follow in office as scheduled

## 2021-04-26 ENCOUNTER — HOSPITAL ENCOUNTER (OUTPATIENT)
Age: 61
Discharge: HOME | End: 2021-04-26
Payer: MEDICAID

## 2021-04-26 ENCOUNTER — OFFICE VISIT (OUTPATIENT)
Dept: GASTROENTEROLOGY | Age: 61
End: 2021-04-26
Payer: MEDICAID

## 2021-04-26 ENCOUNTER — HOSPITAL ENCOUNTER (OUTPATIENT)
Age: 61
End: 2021-04-26
Payer: MEDICAID

## 2021-04-26 VITALS
WEIGHT: 116 LBS | DIASTOLIC BLOOD PRESSURE: 94 MMHG | HEIGHT: 67 IN | HEART RATE: 94 BPM | SYSTOLIC BLOOD PRESSURE: 169 MMHG | BODY MASS INDEX: 18.21 KG/M2

## 2021-04-26 DIAGNOSIS — K52.831 COLLAGENOUS COLITIS: Primary | ICD-10-CM

## 2021-04-26 DIAGNOSIS — K52.9 CHRONIC DIARRHEA: ICD-10-CM

## 2021-04-26 DIAGNOSIS — R19.7 DIARRHEA, UNSPECIFIED TYPE: ICD-10-CM

## 2021-04-26 LAB — C DIFF TOXIN/ANTIGEN: NORMAL

## 2021-04-26 PROCEDURE — 99213 OFFICE O/P EST LOW 20 MIN: CPT | Performed by: INTERNAL MEDICINE

## 2021-04-26 PROCEDURE — 3017F COLORECTAL CA SCREEN DOC REV: CPT | Performed by: INTERNAL MEDICINE

## 2021-04-26 PROCEDURE — G8419 CALC BMI OUT NRM PARAM NOF/U: HCPCS | Performed by: INTERNAL MEDICINE

## 2021-04-26 PROCEDURE — G8427 DOCREV CUR MEDS BY ELIG CLIN: HCPCS | Performed by: INTERNAL MEDICINE

## 2021-04-26 PROCEDURE — 87493 C DIFF AMPLIFIED PROBE: CPT

## 2021-04-26 PROCEDURE — 4004F PT TOBACCO SCREEN RCVD TLK: CPT | Performed by: INTERNAL MEDICINE

## 2021-04-26 PROCEDURE — 87324 CLOSTRIDIUM AG IA: CPT

## 2021-04-26 PROCEDURE — 87449 NOS EACH ORGANISM AG IA: CPT

## 2021-04-26 RX ORDER — CYCLOBENZAPRINE HCL 10 MG
TABLET ORAL
COMMUNITY
Start: 2021-03-24

## 2021-04-26 RX ORDER — INDACATEROL MALEATE AND GLYCOPYRROLATE 27.5; 15.6 UG/1; UG/1
CAPSULE RESPIRATORY (INHALATION)
COMMUNITY
Start: 2021-02-26 | End: 2021-07-13 | Stop reason: ALTCHOICE

## 2021-04-26 NOTE — PROGRESS NOTES
[address] ,  [address] [facility]  Phone: [phone]   SMR:[phone]    CHIEF COMPLAINT     Chief Complaint   Patient presents with    Follow-up     3mos f/u        HPI     Sandhya Saunders is a 61 y.o. female who presents for followup for diarrhea, microscopic colitis. One week ago started with a flare up and had more diarrhea 4 loose daily stools. Had responded to budesonide very well in the past so was given a script for this and just started back on the budesonide taper 3 days ago, now it is already helping and feels stools are more formed now, only had one stool today  She had finished her first budesonide taper 2 months ago and was doing well till about 1 week ago. Denies food triggers or antibiotics. C diff was INDETERMINATE 4/26 and PCR is pending. No abdominal pain, no rectal bleeding. Prior workup:  Colonoscopy 12/21/2020 - Small internal hemorrhoids. Redundant colon. Otherwise normal to the terminal ileum. Random colon biopsies were done to rule out microscopic colitis. Random colon, biopsies: - Microscopic (collagenous) colitis    PAST MEDICAL HISTORY     Past Medical History:   Diagnosis Date    Anxiety     Arthritis     COPD (chronic obstructive pulmonary disease) ([facility] [address]     Depression     Hypertension     Kidney stones     Osteoporosis     Thyroid disease      FAMILY HISTORY   No family history on file.   SOCIAL HISTORY     Social History     Socioeconomic History    Marital status: Legally      Spouse name: Not on file    Number of children: Not on file    Years of education: Not on file    Highest education level: Not on file   Occupational History    Not on file   Social Needs    Financial resource strain: Not on file    Food insecurity     Worry: Not on file     Inability: Not on file    Transportation needs     Medical: Not on file     Non-medical: Not on file   Tobacco Use    Smoking status: Current Every Day Smoker     Packs/day: 0.25     Years: 40.00     Pack years: 10.00     Types: Cigarettes    Smokeless tobacco: Never Used   Substance and Sexual Activity    Alcohol use: No    Drug use: No    Sexual activity: Not on file   Lifestyle    Physical activity     Days per week: Not on file     Minutes per session: Not on file    Stress: Not on file   Relationships    Social connections     Talks on phone: Not on file     Gets together: Not on file     Attends Confucianism service: Not on file     Active member of club or organization: Not on file     Attends meetings of clubs or organizations: Not on file     Relationship status: Not on file    Intimate partner violence     Fear of current or ex partner: Not on file     Emotionally abused: Not on file     Physically abused: Not on file     Forced sexual activity: Not on file   Other Topics Concern    Not on file   Social History Narrative    Not on file     SURGICAL HISTORY     Past Surgical History:   Procedure Laterality Date    BREAST LUMPECTOMY Right 2015     SECTION      COLONOSCOPY N/A 2020    COLONOSCOPY WITH BIOPSY performed by Malik Moody MD at [address]  10/23/2019    [facility] N/A 10/23/2019    CYSTOSCOPY, URETHRAL DILATATION performed by Kaylie Marquez MD at [address] (N/A )    THYROIDECTOMY      UPPER GASTROINTESTINAL ENDOSCOPY  10/12/2020    Dr Dwight Salgado did a EGD BIOPSY 10/12/20    UPPER GASTROINTESTINAL ENDOSCOPY N/A 10/12/2020    EGD BIOPSY performed by Malik Moody MD at [facility]   (This list may include medications prescribed during this encounter as epic can not insert only the list prior to this encounter.)  Current Outpatient Rx   Medication Sig Dispense Refill    cyclobenzaprine (FLEXERIL) 10 MG tablet       Indacaterol-Glycopyrrolate (Callie Sanchez) 27.5-15.6 MCG CAPS       budesonide (ENTOCORT EC) 3 MG extended release capsule 9 mg daily for 8 weeks, then 6 mg for two weeks, then by 3 mg for another two weeks 210 capsule 0    famotidine (PEPCID) 20 MG tablet Take 1 tablet by mouth 2 times daily 180 tablet 1    umeclidinium-vilanterol (ANORO ELLIPTA) 62.5-25 MCG/INH AEPB inhaler INHALE ONE DOSE BY MOUTH DAILY 1 each 5    budesonide (ENTOCORT EC) 3 MG extended release capsule 9 mg daily for 8 weeks, then 6 mg for two weeks, then by 3 mg for another two weeks 210 capsule 0    meloxicam (MOBIC) 7.5 MG tablet Take 7.5 mg by mouth 2 times daily as needed       levothyroxine (SYNTHROID) 50 MCG tablet       omeprazole (PRILOSEC) 20 MG delayed release capsule Take 20 mg by mouth Daily       albuterol sulfate  (90 Base) MCG/ACT inhaler Inhale 2 puffs into the lungs every 6 hours as needed for Wheezing 1 Inhaler 6    oxyCODONE-acetaminophen (PERCOCET) 5-325 MG per tablet Take 1 tablet by mouth 3 times daily.        Cholecalciferol (VITAMIN D3) 5000 units CAPS       albuterol (PROVENTIL) (2.5 MG/3ML) 0.083% nebulizer solution Take 3 mLs by nebulization every 6 hours as needed for Wheezing 60 vial 0    NARCAN 4 MG/0.1ML LIQD nasal spray       glycopyrrolate-formoterol (BEVESPI AEROSPHERE) 9-4.8 MCG/ACT AERO       colestipol (COLESTID) 1 g tablet Take 2 tablets by mouth 2 times daily (Patient not taking: Reported on 4/26/2021) 120 tablet 1    FLUoxetine (PROZAC) 20 MG capsule Take 20 mg by mouth daily          ALLERGIES     Allergies   Allergen Reactions    Codeine Nausea Only    Contrast [Iodides]     Vicodin [Hydrocodone-Acetaminophen] Other (See Comments)     GI upset       IMMUNIZATIONS     Immunization History   Administered Date(s) Administered    Influenza Virus Vaccine 10/01/2018    Influenza, Quadv, IM, (6 mo and older Fluzone, Flulaval, Fluarix and 3 yrs and older Afluria) 10/01/2018       REVIEW OF SYSTEMS Constitutional: denies fever, weight change if present has been documented in HPI   HENT: Negative for ear pain, hearing loss and nosebleeds. Eyes: Negative for pain and visual disturbance. Respiratory: Negative for cough, shortness of breath and wheezing. Cardiovascular: Negative for chest pain, palpitations and leg swelling. Gastrointestinal: see HPI for details. Musculoskeletal: Positive for arthralgias and back pain. Skin: Negative for pallor and rash. Hematological: Negative for adenopathy. Does not bruise/bleed easily. Psychiatric/Behavioral: Negative for agitation, confusion, hallucinations. PHYSICAL EXAM   VITAL SIGNS: BP (!) 169/94 (Site: Right Wrist)   Pulse 94   Ht 5' 7\" (1.702 m)   Wt 116 lb (52.6 kg)   BMI 18.17 kg/m²   Wt Readings from Last 3 Encounters:   04/26/21 116 lb (52.6 kg)   01/19/21 111 lb 6.4 oz (50.5 kg)   12/18/20 103 lb (46.7 kg)     Gen: AAO3, NAD  HEENT: no pallor or icterus  RS: clear to auscultation bilaterally  CVS: S1S2 RRR, no murmurs  GI: soft, nontender, not distended, BS+, no hepatosplenomegaly  Ext: no edema or clubbing    FINAL IMPRESSION     Microscopic colitis - having a flare up. Had responded well to budesonide before and was given another course of this started 3 days ago, will do the 12 week taper as written. C diff was INDETERMINATE 4/26 and PCR is pending. Followup 3-4 months ago with Dr Peg Valerio or Dr Kaye Green  Patient aware I will be leaving the practice.

## 2021-04-27 ENCOUNTER — TELEPHONE (OUTPATIENT)
Dept: GASTROENTEROLOGY | Age: 61
End: 2021-04-27

## 2021-04-27 DIAGNOSIS — A04.72 CLOSTRIDIUM DIFFICILE COLITIS: Primary | ICD-10-CM

## 2021-04-27 LAB
C. DIFFICILE TOXIN MOLECULAR: ABNORMAL
ORGANISM: ABNORMAL

## 2021-04-27 RX ORDER — VANCOMYCIN HYDROCHLORIDE 125 MG/1
125 CAPSULE ORAL 4 TIMES DAILY
Qty: 40 CAPSULE | Refills: 0 | Status: SHIPPED | OUTPATIENT
Start: 2021-04-27 | End: 2021-05-07

## 2021-04-27 NOTE — TELEPHONE ENCOUNTER
Janis Ingram from [facility] lab called to inform us the c diff test done on the pt came back positive.

## 2021-07-06 RX ORDER — UMECLIDINIUM BROMIDE AND VILANTEROL TRIFENATATE 62.5; 25 UG/1; UG/1
POWDER RESPIRATORY (INHALATION)
Qty: 1 EACH | Refills: 5 | Status: SHIPPED | OUTPATIENT
Start: 2021-07-06 | End: 2022-01-14

## 2021-07-07 ENCOUNTER — HOSPITAL ENCOUNTER (OUTPATIENT)
Age: 61
Discharge: HOME | End: 2021-07-07
Payer: MEDICAID

## 2021-07-07 PROCEDURE — U0003 INFECTIOUS AGENT DETECTION BY NUCLEIC ACID (DNA OR RNA); SEVERE ACUTE RESPIRATORY SYNDROME CORONAVIRUS 2 (SARS-COV-2) (CORONAVIRUS DISEASE [COVID-19]), AMPLIFIED PROBE TECHNIQUE, MAKING USE OF HIGH THROUGHPUT TECHNOLOGIES AS DESCRIBED BY CMS-2020-01-R: HCPCS

## 2021-07-07 PROCEDURE — U0005 INFEC AGEN DETEC AMPLI PROBE: HCPCS

## 2021-07-08 LAB — SARS-COV-2: NOT DETECTED

## 2021-07-13 ENCOUNTER — OFFICE VISIT (OUTPATIENT)
Dept: PULMONOLOGY | Age: 61
End: 2021-07-13
Payer: MEDICAID

## 2021-07-13 ENCOUNTER — HOSPITAL ENCOUNTER (OUTPATIENT)
Dept: PULMONOLOGY | Age: 61
Discharge: HOME | End: 2021-07-13
Payer: MEDICAID

## 2021-07-13 ENCOUNTER — HOSPITAL ENCOUNTER (OUTPATIENT)
Dept: CT IMAGING | Age: 61
Discharge: HOME | End: 2021-07-13
Payer: MEDICAID

## 2021-07-13 VITALS
TEMPERATURE: 98 F | DIASTOLIC BLOOD PRESSURE: 82 MMHG | RESPIRATION RATE: 20 BRPM | HEIGHT: 66 IN | BODY MASS INDEX: 18.54 KG/M2 | HEART RATE: 72 BPM | WEIGHT: 115.4 LBS | SYSTOLIC BLOOD PRESSURE: 120 MMHG | OXYGEN SATURATION: 97 %

## 2021-07-13 DIAGNOSIS — F17.210 CIGARETTE SMOKER: ICD-10-CM

## 2021-07-13 DIAGNOSIS — Z87.891 PERSONAL HISTORY OF TOBACCO USE: ICD-10-CM

## 2021-07-13 DIAGNOSIS — Z87.891 PERSONAL HISTORY OF TOBACCO USE: Primary | ICD-10-CM

## 2021-07-13 DIAGNOSIS — J44.9 COPD, SEVERE (HCC): ICD-10-CM

## 2021-07-13 DIAGNOSIS — J44.9 CHRONIC OBSTRUCTIVE PULMONARY DISEASE, UNSPECIFIED COPD TYPE (HCC): ICD-10-CM

## 2021-07-13 LAB
DLCO %PRED: 37 %
DLCO PRED: NORMAL
DLCO/VA %PRED: NORMAL
DLCO/VA PRED: NORMAL
DLCO/VA: NORMAL
DLCO: NORMAL
EXPIRATORY TIME-POST: NORMAL
EXPIRATORY TIME: NORMAL
FEF 25-75% %CHNG: NORMAL
FEF 25-75% %PRED-POST: NORMAL
FEF 25-75% %PRED-PRE: NORMAL
FEF 25-75% PRED: NORMAL
FEF 25-75%-POST: NORMAL
FEF 25-75%-PRE: NORMAL
FEV1 %PRED-POST: 43 %
FEV1 %PRED-PRE: 35 %
FEV1 PRED: NORMAL
FEV1-POST: NORMAL
FEV1-PRE: NORMAL
FEV1/FVC %PRED-POST: NORMAL
FEV1/FVC %PRED-PRE: NORMAL
FEV1/FVC PRED: NORMAL
FEV1/FVC-POST: 36 %
FEV1/FVC-PRE: 33 %
FVC %PRED-POST: NORMAL
FVC %PRED-PRE: NORMAL
FVC PRED: NORMAL
FVC-POST: NORMAL
FVC-PRE: NORMAL
GAW %PRED: NORMAL
GAW PRED: NORMAL
GAW: NORMAL
IC %PRED: NORMAL
IC PRED: NORMAL
IC: NORMAL
MEP: NORMAL
MIP: NORMAL
MVV %PRED-PRE: NORMAL
MVV PRED: NORMAL
MVV-PRE: NORMAL
PEF %PRED-POST: NORMAL
PEF %PRED-PRE: NORMAL
PEF PRED: NORMAL
PEF%CHNG: NORMAL
PEF-POST: NORMAL
PEF-PRE: NORMAL
RAW %PRED: NORMAL
RAW PRED: NORMAL
RAW: NORMAL
RV %PRED: NORMAL
RV PRED: NORMAL
RV: NORMAL
SVC %PRED: NORMAL
SVC PRED: NORMAL
SVC: NORMAL
TLC %PRED: 102 %
TLC PRED: NORMAL
TLC: NORMAL
VA %PRED: NORMAL
VA PRED: NORMAL
VA: NORMAL
VTG %PRED: NORMAL
VTG PRED: NORMAL
VTG: NORMAL

## 2021-07-13 PROCEDURE — 94729 DIFFUSING CAPACITY: CPT

## 2021-07-13 PROCEDURE — 4004F PT TOBACCO SCREEN RCVD TLK: CPT | Performed by: INTERNAL MEDICINE

## 2021-07-13 PROCEDURE — G8420 CALC BMI NORM PARAMETERS: HCPCS | Performed by: INTERNAL MEDICINE

## 2021-07-13 PROCEDURE — G0296 VISIT TO DETERM LDCT ELIG: HCPCS | Performed by: INTERNAL MEDICINE

## 2021-07-13 PROCEDURE — 6370000000 HC RX 637 (ALT 250 FOR IP): Performed by: INTERNAL MEDICINE

## 2021-07-13 PROCEDURE — G8427 DOCREV CUR MEDS BY ELIG CLIN: HCPCS | Performed by: INTERNAL MEDICINE

## 2021-07-13 PROCEDURE — 3023F SPIROM DOC REV: CPT | Performed by: INTERNAL MEDICINE

## 2021-07-13 PROCEDURE — 94726 PLETHYSMOGRAPHY LUNG VOLUMES: CPT

## 2021-07-13 PROCEDURE — 94618 PULMONARY STRESS TESTING: CPT

## 2021-07-13 PROCEDURE — 99214 OFFICE O/P EST MOD 30 MIN: CPT | Performed by: INTERNAL MEDICINE

## 2021-07-13 PROCEDURE — 94640 AIRWAY INHALATION TREATMENT: CPT

## 2021-07-13 PROCEDURE — G8926 SPIRO NO PERF OR DOC: HCPCS | Performed by: INTERNAL MEDICINE

## 2021-07-13 PROCEDURE — 99406 BEHAV CHNG SMOKING 3-10 MIN: CPT | Performed by: INTERNAL MEDICINE

## 2021-07-13 PROCEDURE — 71271 CT THORAX LUNG CANCER SCR C-: CPT

## 2021-07-13 PROCEDURE — 94060 EVALUATION OF WHEEZING: CPT

## 2021-07-13 PROCEDURE — 3017F COLORECTAL CA SCREEN DOC REV: CPT | Performed by: INTERNAL MEDICINE

## 2021-07-13 RX ORDER — ALBUTEROL SULFATE 90 UG/1
4 AEROSOL, METERED RESPIRATORY (INHALATION) ONCE
Status: COMPLETED | OUTPATIENT
Start: 2021-07-13 | End: 2021-07-13

## 2021-07-13 RX ADMIN — Medication 4 PUFF: at 10:20

## 2021-07-13 ASSESSMENT — PULMONARY FUNCTION TESTS
FEV1_PERCENT_PREDICTED_POST: 43
FEV1/FVC_PRE: 33
FEV1/FVC_POST: 36
FEV1_PERCENT_PREDICTED_PRE: 35

## 2021-07-13 NOTE — PROGRESS NOTES
[facility]    Outpatient Follow Up Note    Chief Complaint: abnormal ct scan  Consulting provider: De Maddox. NP    Interval History: 61 y.o. femaleAnoro has been helpful. Morning cough. Int wheeze  Still smoking 1/7th PPD    Initial HPI:regarding an abnormal Chest CT. She presented to the emergency room in January and was treated for pneumonia which was found on her CT scan. The pneumonia had some nodular components and the patient was referred for follow-up. The patient has a history of smoking and COPD. The patient does not have SOB at rest but has ACKERMAN. Exercise tolerance on level ground is not limited. The patient has a daily intermittent cough that is usually dry or sometimes productive of yellow sputum. The patient does wheeze intermittently. No fever.      The patient has smoked 1- 1.5 PPD for 40 years.   Environmental/chemical exposure: Asbestos exposure: no  Patient  is on disability    Current Outpatient Medications:     ANORO ELLIPTA 62.5-25 MCG/INH AEPB inhaler, INHALE ONE DOSE BY MOUTH DAILY, Disp: 1 each, Rfl: 5    cyclobenzaprine (FLEXERIL) 10 MG tablet, , Disp: , Rfl:     budesonide (ENTOCORT EC) 3 MG extended release capsule, 9 mg daily for 8 weeks, then 6 mg for two weeks, then by 3 mg for another two weeks, Disp: 210 capsule, Rfl: 0    famotidine (PEPCID) 20 MG tablet, Take 1 tablet by mouth 2 times daily, Disp: 180 tablet, Rfl: 1    NARCAN 4 MG/0.1ML LIQD nasal spray, , Disp: , Rfl:     budesonide (ENTOCORT EC) 3 MG extended release capsule, 9 mg daily for 8 weeks, then 6 mg for two weeks, then by 3 mg for another two weeks, Disp: 210 capsule, Rfl: 0    colestipol (COLESTID) 1 g tablet, Take 2 tablets by mouth 2 times daily, Disp: 120 tablet, Rfl: 1    meloxicam (MOBIC) 7.5 MG tablet, Take 7.5 mg by mouth 2 times daily as needed , Disp: , Rfl:     levothyroxine (SYNTHROID) 50 MCG tablet, , Disp: , Rfl:     omeprazole (PRILOSEC) 20 MG delayed release capsule, Take 20 mg by mouth Daily , Disp: , Rfl:     albuterol sulfate  (90 Base) MCG/ACT inhaler, Inhale 2 puffs into the lungs every 6 hours as needed for Wheezing, Disp: 1 Inhaler, Rfl: 6    oxyCODONE-acetaminophen (PERCOCET) 5-325 MG per tablet, Take 1 tablet by mouth 3 times daily. , Disp: , Rfl:     FLUoxetine (PROZAC) 20 MG capsule, Take 20 mg by mouth daily, Disp: , Rfl:     Cholecalciferol (VITAMIN D3) 5000 units CAPS, , Disp: , Rfl:     albuterol (PROVENTIL) (2.5 MG/3ML) 0.083% nebulizer solution, Take 3 mLs by nebulization every 6 hours as needed for Wheezing, Disp: 60 vial, Rfl: 0    Objective:   PHYSICAL EXAM: /82   Pulse 72   Temp 98 °F (36.7 °C)   Resp 20   Ht 5' 6\" (1.676 m)   Wt 115 lb 6.4 oz (52.3 kg)   SpO2 97% Comment: w RA  BMI 18.63 kg/m²   Constitutional:  No acute distress. Eyes: PERRL. Conjunctivae anicteric. ENT: Normal nose. Normal tongue. Neck:  Trachea is midline. No thyroid tenderness. Respiratory: No accessory muscle usage. Decreased breath sounds. No wheezes. No rales. No Rhonchi. Cardiovascular: Normal S1S2. No digit clubbing. No digit cyanosis. No LE edema. Psychiatric: No anxiety or Agitation. Alert and Oriented to person, place and time. LABS:  Reviewed any pertinent new labs that are available. PFTs 7/13/21  FVC  (82%) FEV1 1.00 (35%) FEV1/FVC ratio 0.33  TLC  (102%)  RV  (149%)   DLCO (37%) Bronchodilator response: ++   6MWT: 600ft, 96%    IMAGING:  I personally reviewed and interpreted the following today in the office:   5/3/19 Chest CT:  1. Near complete resolution of airspace changes in the lingula.    2. Resolution of airspace changes in the right middle and lower lobes as well   with no residual nodules.  Calcified granulomata are stable.      7/2/20 LDCT  New noncalcified nodule in the lingula measuring 4 mm.       Otherwise, no significant change in underlying emphysema, scattered areas of   bronchial wall thickening-mucous plugging.  Tiny pulmonary nodule in the   right upper lobe is also unchanged. 7/13/21 LDCT Chest: 1. Unchanged 3 mm solid bilateral pulmonary nodules. ASSESSMENT:  · Severe COPD  · Tobacco abuse     PLAN:   · The current inhaler is effective and will continue Anoro   · Pulmonary rehab declined due to caring for grand kids  · We discussed smoking cessation for >3 minutes. We discussed the association of smoking with the patient's current symptoms and the risks of continued use and the options for achieving cessation. The patient was advised to set a quit date and alert family members and remove smoking paraphernalia before the quit date. She currently is in the contemplative phase of quitting and will continue to consider specific actions. Nicotine replacement therapy was offered to the patient. 7mg patches Rx  · F/u in 12 months with a screening chest CT  · Screening CT scan was considered in a lung cancer screening counseling and shared decision making visit today that included the following elements:   · Eligibility: Age: 61. There are no signs or symptoms of lung cancer.  Tobacco History 40-60 pack-years  · Verbal counseling has been performed by me to include benefits and harms of screening, follow-up diagnostic testing, over-diagnosis, false positive rate, and total radiation exposure;   · I have counseled on the importance of adherence to annual lung cancer LDCT screening, the impact of comorbidities and patient is willing to undergo diagnosis and treatment;   · I have provided counseling on the importance of maintaining cigarette smoking abstinence if former smoker; or the importance of smoking cessation if current smoker and, if appropriate, furnishing of information about tobacco cessation interventions; and

## 2021-07-14 NOTE — PROCEDURES
[address]                 [address] [address]                               PULMONARY FUNCTION    PATIENT NAME: Gala Vazquez                 :        1960  MED REC NO:   [id number]                          ROOM:  ACCOUNT NO:   [de-identified]                           ADMIT DATE: 2021  PROVIDER:     Luke Lovett MD    DATE OF PROCEDURE:  2021    INDICATION:  COPD. FINDINGS:  1. Spirometry revealed evidence of severe obstructive defect. The FEV1  is 1 liter, which is 35% of predicted with significant response to  bronchodilators in FEV1 and FVC. The FEV1/FVC ratio of 33%. 2.  Lung volume revealed normal total lung capacity of 5.84 liters,  which is 102% of predicted. Evidenced air trapping residual volume of  3.04 liters, which is 149% of predicted. 3.  Diffusion capacity is severely decreased at 9.39, which is 37% of  predicted. 4.  Flow volume loops consistent with an obstructive defect. 5.  A six-minute walk was done per [facility]'s protocol. The patient was able to walk only 600 feet and had to stop due to  shortness of breath. Saturation on room air at rest was 100% with the  heart rate of 74. No significant desaturation on exertion. Max heart  rate of 85. CONCLUSION:  1. Severe obstructive defect with the bronchodilator response, air  trapping and severely decreased diffusion capacity. 2.  Six-minute walk submaximal due to shortness of breath with no  significant desaturation.         Reyes Polo MD    D: 2021 12:54:27       T: 2021 16:24:07     [email or web address]  Job#: [id number]     Doc#: [id number]    CC:

## 2021-08-12 ENCOUNTER — OFFICE VISIT (OUTPATIENT)
Dept: GASTROENTEROLOGY | Age: 61
End: 2021-08-12
Payer: MEDICAID

## 2021-08-12 VITALS
WEIGHT: 116.8 LBS | DIASTOLIC BLOOD PRESSURE: 87 MMHG | BODY MASS INDEX: 19.94 KG/M2 | HEART RATE: 82 BPM | HEIGHT: 64 IN | SYSTOLIC BLOOD PRESSURE: 128 MMHG

## 2021-08-12 DIAGNOSIS — K52.9 CHRONIC DIARRHEA: Primary | ICD-10-CM

## 2021-08-12 DIAGNOSIS — K52.831 COLLAGENOUS COLITIS: ICD-10-CM

## 2021-08-12 PROCEDURE — G8420 CALC BMI NORM PARAMETERS: HCPCS | Performed by: INTERNAL MEDICINE

## 2021-08-12 PROCEDURE — 99212 OFFICE O/P EST SF 10 MIN: CPT | Performed by: INTERNAL MEDICINE

## 2021-08-12 PROCEDURE — 3017F COLORECTAL CA SCREEN DOC REV: CPT | Performed by: INTERNAL MEDICINE

## 2021-08-12 PROCEDURE — 4004F PT TOBACCO SCREEN RCVD TLK: CPT | Performed by: INTERNAL MEDICINE

## 2021-08-12 PROCEDURE — G8427 DOCREV CUR MEDS BY ELIG CLIN: HCPCS | Performed by: INTERNAL MEDICINE

## 2021-08-12 RX ORDER — BUDESONIDE 3 MG/1
3 CAPSULE, COATED PELLETS ORAL EVERY MORNING
Qty: 30 CAPSULE | Refills: 0 | Status: SHIPPED | OUTPATIENT
Start: 2021-08-12 | End: 2021-09-09

## 2021-08-12 RX ORDER — CHOLESTYRAMINE 4 G/9G
1 POWDER, FOR SUSPENSION ORAL 3 TIMES DAILY
Qty: 90 PACKET | Refills: 3 | Status: SHIPPED | OUTPATIENT
Start: 2021-08-12

## 2021-08-12 NOTE — PROGRESS NOTES
How long has the patient had diarrhea -4 years    How many BM per day- 3 times    Are most BM - Normal amount    Are you experiencing any of the following - No    Do you wake up at night to have a BM - Yes    Do you have diarrhea after eating dairy products - Tries to avoid dairy, as it does exacerbate her symptoms

## 2021-08-16 ENCOUNTER — TELEPHONE (OUTPATIENT)
Dept: PULMONOLOGY | Age: 61
End: 2021-08-16

## 2021-08-19 ENCOUNTER — HOSPITAL ENCOUNTER (OUTPATIENT)
Age: 61
Discharge: HOME | End: 2021-08-19
Payer: MEDICAID

## 2021-08-19 DIAGNOSIS — K52.9 CHRONIC DIARRHEA: ICD-10-CM

## 2021-08-19 NOTE — TELEPHONE ENCOUNTER
N/Aon August 17  Your PA request has been closed. There is an active Prior Authorization approval on file until 01/22/22. Based on this patient's pharmacy history, there is a paid claim for this medication on 08/14/21. This request is now closed.

## 2021-09-08 DIAGNOSIS — K52.831 COLLAGENOUS COLITIS: ICD-10-CM

## 2021-09-09 RX ORDER — BUDESONIDE 3 MG/1
CAPSULE, COATED PELLETS ORAL
Qty: 30 CAPSULE | Refills: 0 | Status: SHIPPED | OUTPATIENT
Start: 2021-09-09

## 2021-09-09 NOTE — TELEPHONE ENCOUNTER
Lat visit 8-  No future appt scheduled    Requesting refill on BUDESONIDE EC 3 MG CAPSULE    Please advise

## 2021-09-13 RX ORDER — ALBUTEROL SULFATE 90 UG/1
AEROSOL, METERED RESPIRATORY (INHALATION)
Qty: 18 G | Refills: 5 | Status: SHIPPED | OUTPATIENT
Start: 2021-09-13

## 2021-09-20 ENCOUNTER — HOSPITAL ENCOUNTER (OUTPATIENT)
Dept: GENERAL RADIOLOGY | Age: 61
Discharge: HOME | End: 2021-09-20
Payer: MEDICAID

## 2021-09-20 ENCOUNTER — HOSPITAL ENCOUNTER (OUTPATIENT)
Age: 61
Discharge: HOME | End: 2021-09-20
Payer: MEDICAID

## 2021-09-20 DIAGNOSIS — M54.12 CERVICAL RADICULAR PAIN: ICD-10-CM

## 2021-09-20 PROCEDURE — 72040 X-RAY EXAM NECK SPINE 2-3 VW: CPT

## 2021-09-29 ENCOUNTER — HOSPITAL ENCOUNTER (OUTPATIENT)
Dept: MAMMOGRAPHY | Age: 61
Discharge: HOME | End: 2021-10-04
Payer: MEDICAID

## 2021-09-29 VITALS — WEIGHT: 116 LBS | BODY MASS INDEX: 18.64 KG/M2 | HEIGHT: 66 IN

## 2021-09-29 DIAGNOSIS — Z12.31 VISIT FOR SCREENING MAMMOGRAM: ICD-10-CM

## 2021-09-29 PROCEDURE — 77067 SCR MAMMO BI INCL CAD: CPT

## 2021-10-05 ENCOUNTER — HOSPITAL ENCOUNTER (OUTPATIENT)
Dept: GENERAL RADIOLOGY | Age: 61
Discharge: HOME | End: 2021-10-05
Payer: MEDICAID

## 2021-10-05 DIAGNOSIS — M81.0 OSTEOPOROSIS WITHOUT CURRENT PATHOLOGICAL FRACTURE, UNSPECIFIED OSTEOPOROSIS TYPE: ICD-10-CM

## 2021-10-05 PROCEDURE — 77080 DXA BONE DENSITY AXIAL: CPT

## 2021-10-19 DIAGNOSIS — K52.9 CHRONIC DIARRHEA: Primary | ICD-10-CM

## 2021-10-28 ENCOUNTER — HOSPITAL ENCOUNTER (OUTPATIENT)
Age: 61
Discharge: HOME | End: 2021-10-28
Payer: MEDICAID

## 2021-10-28 LAB — C DIFF TOXIN/ANTIGEN: NORMAL

## 2021-10-28 PROCEDURE — 87449 NOS EACH ORGANISM AG IA: CPT

## 2021-10-28 PROCEDURE — 87324 CLOSTRIDIUM AG IA: CPT

## 2021-12-01 ENCOUNTER — HOSPITAL ENCOUNTER (OUTPATIENT)
Dept: MAMMOGRAPHY | Age: 61
Discharge: HOME | End: 2021-12-01
Payer: MEDICAID

## 2021-12-01 ENCOUNTER — HOSPITAL ENCOUNTER (OUTPATIENT)
Dept: ULTRASOUND IMAGING | Age: 61
Discharge: HOME | End: 2021-12-01
Payer: MEDICAID

## 2021-12-01 DIAGNOSIS — R92.8 ABNORMALITY OF RIGHT BREAST ON SCREENING MAMMOGRAPHY: ICD-10-CM

## 2021-12-01 PROCEDURE — 76642 ULTRASOUND BREAST LIMITED: CPT

## 2021-12-01 PROCEDURE — 77065 DX MAMMO INCL CAD UNI: CPT

## 2021-12-15 ENCOUNTER — CLINICAL DOCUMENTATION (OUTPATIENT)
Dept: OTHER | Age: 61
End: 2021-12-15

## 2022-01-14 RX ORDER — UMECLIDINIUM BROMIDE AND VILANTEROL TRIFENATATE 62.5; 25 UG/1; UG/1
POWDER RESPIRATORY (INHALATION)
Qty: 60 EACH | Refills: 5 | Status: SHIPPED | OUTPATIENT
Start: 2022-01-14 | End: 2022-07-18

## 2022-05-03 ENCOUNTER — HOSPITAL ENCOUNTER (OUTPATIENT)
Dept: MRI IMAGING | Age: 62
Discharge: HOME | End: 2022-05-03
Payer: MEDICAID

## 2022-05-03 DIAGNOSIS — M54.12 CERVICAL RADICULAR PAIN: ICD-10-CM

## 2022-05-03 PROCEDURE — 72141 MRI NECK SPINE W/O DYE: CPT

## 2022-07-18 ENCOUNTER — TELEPHONE (OUTPATIENT)
Dept: PULMONOLOGY | Age: 62
End: 2022-07-18

## 2022-07-18 ENCOUNTER — HOSPITAL ENCOUNTER (OUTPATIENT)
Dept: CT IMAGING | Age: 62
Discharge: HOME | End: 2022-07-18
Payer: MEDICAID

## 2022-07-18 DIAGNOSIS — Z87.891 PERSONAL HISTORY OF TOBACCO USE: Primary | ICD-10-CM

## 2022-07-18 DIAGNOSIS — J44.9 COPD, SEVERE (HCC): Primary | ICD-10-CM

## 2022-07-18 DIAGNOSIS — Z87.891 PERSONAL HISTORY OF TOBACCO USE: ICD-10-CM

## 2022-07-18 PROCEDURE — 71271 CT THORAX LUNG CANCER SCR C-: CPT

## 2022-07-18 RX ORDER — UMECLIDINIUM BROMIDE AND VILANTEROL TRIFENATATE 62.5; 25 UG/1; UG/1
POWDER RESPIRATORY (INHALATION)
Qty: 1 EACH | Refills: 0 | Status: SHIPPED | OUTPATIENT
Start: 2022-07-18

## 2022-07-18 NOTE — TELEPHONE ENCOUNTER
Radiology called and stated that the pt's CT lung screen order was . Order signed. Oxybutynin Pregnancy And Lactation Text: This medication is Pregnancy Category B and is considered safe during pregnancy. It is unknown if it is excreted in breast milk.

## 2022-07-21 ENCOUNTER — OFFICE VISIT (OUTPATIENT)
Dept: PULMONOLOGY | Age: 62
End: 2022-07-21
Payer: MEDICAID

## 2022-07-21 VITALS
HEIGHT: 66 IN | OXYGEN SATURATION: 96 % | SYSTOLIC BLOOD PRESSURE: 130 MMHG | WEIGHT: 95 LBS | RESPIRATION RATE: 14 BRPM | DIASTOLIC BLOOD PRESSURE: 80 MMHG | BODY MASS INDEX: 15.27 KG/M2 | HEART RATE: 68 BPM

## 2022-07-21 DIAGNOSIS — J44.9 STAGE 3 SEVERE COPD BY GOLD CLASSIFICATION (HCC): Primary | ICD-10-CM

## 2022-07-21 PROCEDURE — 99214 OFFICE O/P EST MOD 30 MIN: CPT | Performed by: INTERNAL MEDICINE

## 2022-07-21 RX ORDER — FLUTICASONE FUROATE, UMECLIDINIUM BROMIDE AND VILANTEROL TRIFENATATE 100; 62.5; 25 UG/1; UG/1; UG/1
1 POWDER RESPIRATORY (INHALATION) DAILY
Qty: 1 EACH | Refills: 3 | Status: SHIPPED | OUTPATIENT
Start: 2022-07-21

## 2022-07-21 RX ORDER — ALBUTEROL SULFATE 2.5 MG/3ML
2.5 SOLUTION RESPIRATORY (INHALATION) EVERY 6 HOURS PRN
Qty: 60 EACH | Refills: 0 | Status: SHIPPED | OUTPATIENT
Start: 2022-07-21

## 2022-07-21 RX ORDER — POLYETHYLENE GLYCOL 3350 17 G
2 POWDER IN PACKET (EA) ORAL PRN
Qty: 100 EACH | Refills: 3 | Status: SHIPPED | OUTPATIENT
Start: 2022-07-21

## 2022-07-21 NOTE — PROGRESS NOTES
[facility]                                           Pulmonary Clinic Consult     I had the pleasure of seeing  Noni Diaz     Chief Complaint   Patient presents with    Follow-up     1yr ct lung screen, copd        HISTORY OF PRESENT ILLNESS:    Noni Diaz is a 64y.o. year old  Who start smoking at age 13 and increasing gradually to 1 pack daily and at x1.5 has about 60 pack. Smoking history    Patient has diagnosis of severe COPD with FEV1 35 and she has been using Anoro and nebulizer as needed    She also follow with Dr. Julia Stover for lung nodule that has been stable    Patient has limited activity as she can walk less than 200 feet at times and gets short winded and less than hips however can do half block with no issues    She has some cough with yellow sputum at times    She has occasional wheezing    She denies any weight weight loss or    ALLERGIES:    Allergies   Allergen Reactions    Codeine Nausea Only    Contrast [Iodides]     Vicodin [Hydrocodone-Acetaminophen] Other (See Comments)     GI upset       PAST MEDICAL HISTORY:       Diagnosis Date    Anxiety     Arthritis     COPD (chronic obstructive pulmonary disease) ([facility] [address]     Depression     Hypertension     Kidney stones     Osteoporosis     Thyroid disease        MEDICATIONS:   Current Outpatient Medications   Medication Sig Dispense Refill    fluticasone-umeclidin-vilant (TRELEGY ELLIPTA) 100-62.5-25 MCG/INH AEPB Inhale 1 puff into the lungs in the morning.  1 each 3    ANORO ELLIPTA 62.5-25 MCG/INH AEPB inhaler INHALE ONE DOSE BY MOUTH DAILY 1 each 0    albuterol sulfate  (90 Base) MCG/ACT inhaler INHALE TWO PUFFS BY MOUTH EVERY 6 HOURS AS NEEDED FOR WHEEZING 18 g 5    budesonide (ENTOCORT EC) 3 MG extended release capsule TAKE ONE CAPSULE BY MOUTH EVERY MORNING 30 capsule 0    cholestyramine (QUESTRAN) 4 g packet Take 1 packet by mouth 3 times daily 90 packet 3    nicotine (NICODERM CQ) 7 MG/24HR Place 1 patch onto the skin daily for 14 days 14 patch 0    cyclobenzaprine (FLEXERIL) 10 MG tablet       budesonide (ENTOCORT EC) 3 MG extended release capsule 9 mg daily for 8 weeks, then 6 mg for two weeks, then by 3 mg for another two weeks 210 capsule 0    famotidine (PEPCID) 20 MG tablet Take 1 tablet by mouth 2 times daily 180 tablet 1    colestipol (COLESTID) 1 g tablet Take 2 tablets by mouth 2 times daily 120 tablet 1    meloxicam (MOBIC) 7.5 MG tablet Take 7.5 mg by mouth 2 times daily as needed       levothyroxine (SYNTHROID) 50 MCG tablet       omeprazole (PRILOSEC) 20 MG delayed release capsule Take 20 mg by mouth Daily       FLUoxetine (PROZAC) 20 MG capsule Take 20 mg by mouth daily      Cholecalciferol (VITAMIN D3) 5000 units CAPS       albuterol (PROVENTIL) (2.5 MG/3ML) 0.083% nebulizer solution Take 3 mLs by nebulization every 6 hours as needed for Wheezing 60 vial 0    NARCAN 4 MG/0.1ML LIQD nasal spray  (Patient not taking: Reported on 2022)      oxyCODONE-acetaminophen (PERCOCET) 5-325 MG per tablet Take 1 tablet by mouth 3 times daily. (Patient not taking: Reported on 2022)       No current facility-administered medications for this visit.        SOCIAL AND OCCUPATIONAL HEALTH:  Social History     Tobacco Use   Smoking Status Every Day    Packs/day: 1.00    Years: 40.00    Pack years: 40.00    Types: Cigarettes   Smokeless Tobacco Never   Tobacco Comments    per MD note, pt smoked 1-1.5 ppd for 40 yrs     Industrial exposure: No  Birds :    SURGICAL HISTORY:   Past Surgical History:   Procedure Laterality Date    BREAST LUMPECTOMY Right 2015     SECTION      COLONOSCOPY N/A 2020    COLONOSCOPY WITH BIOPSY performed by Arthur Guillen MD at [address] [facility] [address]  10/23/2019    CYSTOSCOPY, URETHRAL DILATATION     CYSTOSCOPY N/A 10/23/2019    CYSTOSCOPY, URETHRAL DILATATION performed by Susanna Shepard MD at [address] SURGICAL HISTORY      COLONOSCOPY WITH BIOPSY (N/A )    THYROIDECTOMY      UPPER GASTROINTESTINAL ENDOSCOPY  10/12/2020    Dr Xochitl Black did a EGD BIOPSY 10/12/20    UPPER GASTROINTESTINAL ENDOSCOPY N/A 10/12/2020    EGD BIOPSY performed by Sandi Love MD at [address]:   Lung cancer: No  DVT or PE no    REVIEW OF SYSTEMS:  Constitutional: General health is good . There has been no weight changes. No fevers, fatigue or weakness. Head: Patient denies any history of trauma, convulsive disorder or syncope. Skin:  Patient denies history of changes in pigmentation, eruptions or pruritus. No easy bruising or bleeding. EENT: no nasal congestion   Cardiovascular ,No chest pain ,No edema ,  Respiration:SOB: Yes,ACKERMAN : Yes  Gastrointestinal:No GI bleed ,no melena  ,no hematemesis    Musculoskeletal: no joint pain ,no swelling  Neurological:no , syncope. Denies twitching, convulsions, loss of consciousness or memory. Endocrine:  . No history of goiter, exophthalmos or dryness of skin. The patient has no history of diabetes. Hematopoietic:  No history of bleeding disorders or easy bruising. Rheumatic:  No connective tissue disease history or polyarthritis/inflammatory joint disease. PHYSICAL EXAMINATION:  Vitals:    07/21/22 1450   BP: 130/80   Pulse: 68   Resp: 14   SpO2: 96%     Constitutional: This is a well developed, well nourished 64y.o. year old female who is alert, oriented, cooperative and in no apparent distress. Head was normocephalic and atraumatic. EENT: Mallampati class :  Extraocular muscles intact. External canals are patent and no discharge was appreciated. Septum was midline,   mucosa was without erythema, exudates or cobblestoning. No thrush was noted. Neck: Supple without thyromegaly. No elevated JVP. Trachea was midline. No carotid bruits were auscultated.     Respiratory: Rhonchi bilaterally    Cardiovascular: Regular without murmur, clicks, gallops or rubs. There is no left or right ventricular heave. Pulses:  Carotid, radial and femoral pulses were equally bilaterally. Abdomen: Slightly rounded and soft without organomegaly. No rebound, rigidity or guarding was appreciated. Lymphatic: No lymphadenopathy. Musculoskeletal: rhonchi . Extremities: no edema   Skin:  Warm and dry. Good color, turgor and pigmentation. No lesions or scars. Neurological/Psychiatric: The patient's general behavior, level of consciousness, thought content and emotional status is normal.  Cranial nerves II-XII are intact. 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  3-smoking   4-lung nodule                PLAN:      -on Anoro ,will change trelegy   -albuterol nebs as needed  -pulmonary rehab   -I spent 4-6 minutes counseling patient regarding smoking and the risk of Lung cancer and COPD and respiratory failure   _ chemo nicotine   Reviewed CT and Right nodule with patient and seems stable will continue yearly screen     Flu and Pneumovax     Thank you for allowing me to participate in [facility]. I will keep following with you ,should you have any concerns ,please contact us at [place] [facility]     Sincerely,        Arti Tirado MD  Pulmonary & Critical Care Medicine     NOTE: This report was transcribed using voice recognition software. Every effort was made to ensure accuracy; however, inadvertent computerized transcription errors may be present.

## 2023-01-03 NOTE — TELEPHONE ENCOUNTER
Received call from pt who is requesting refill of Albuterol rescue inhaler, pt is also wanting to switch back to Anoro. Pt c/o of dizziness with Trelegy.  Medication pended

## 2023-01-06 ENCOUNTER — HOSPITAL ENCOUNTER (OUTPATIENT)
Dept: GENERAL RADIOLOGY | Age: 63
Discharge: HOME | End: 2023-01-06
Payer: MEDICAID

## 2023-01-06 ENCOUNTER — HOSPITAL ENCOUNTER (OUTPATIENT)
Age: 63
Discharge: HOME | End: 2023-01-06
Payer: MEDICAID

## 2023-01-06 DIAGNOSIS — M25.551 RIGHT HIP PAIN: ICD-10-CM

## 2023-01-06 PROCEDURE — 73502 X-RAY EXAM HIP UNI 2-3 VIEWS: CPT

## 2023-01-06 RX ORDER — FLUTICASONE FUROATE, UMECLIDINIUM BROMIDE AND VILANTEROL TRIFENATATE 100; 62.5; 25 UG/1; UG/1; UG/1
POWDER RESPIRATORY (INHALATION)
Qty: 1 EACH | Refills: 5 | Status: SHIPPED | OUTPATIENT
Start: 2023-01-06

## 2023-01-06 RX ORDER — UMECLIDINIUM BROMIDE AND VILANTEROL TRIFENATATE 62.5; 25 UG/1; UG/1
1 POWDER RESPIRATORY (INHALATION) DAILY
Qty: 60 EACH | Refills: 5 | Status: SHIPPED | OUTPATIENT
Start: 2023-01-06

## 2023-01-06 RX ORDER — ALBUTEROL SULFATE 90 UG/1
AEROSOL, METERED RESPIRATORY (INHALATION)
Qty: 18 G | Refills: 5 | Status: SHIPPED | OUTPATIENT
Start: 2023-01-06

## 2023-01-24 ENCOUNTER — OFFICE VISIT (OUTPATIENT)
Dept: PULMONOLOGY | Age: 63
End: 2023-01-24
Payer: MEDICAID

## 2023-01-24 ENCOUNTER — TELEPHONE (OUTPATIENT)
Dept: PULMONOLOGY | Age: 63
End: 2023-01-24

## 2023-01-24 VITALS
OXYGEN SATURATION: 94 % | WEIGHT: 84 LBS | BODY MASS INDEX: 13.5 KG/M2 | DIASTOLIC BLOOD PRESSURE: 72 MMHG | HEIGHT: 66 IN | SYSTOLIC BLOOD PRESSURE: 116 MMHG | HEART RATE: 74 BPM | RESPIRATION RATE: 20 BRPM

## 2023-01-24 DIAGNOSIS — Z87.891 PERSONAL HISTORY OF TOBACCO USE: Primary | ICD-10-CM

## 2023-01-24 PROCEDURE — G0296 VISIT TO DETERM LDCT ELIG: HCPCS | Performed by: INTERNAL MEDICINE

## 2023-01-24 PROCEDURE — G8484 FLU IMMUNIZE NO ADMIN: HCPCS | Performed by: INTERNAL MEDICINE

## 2023-01-24 PROCEDURE — 3017F COLORECTAL CA SCREEN DOC REV: CPT | Performed by: INTERNAL MEDICINE

## 2023-01-24 PROCEDURE — 99406 BEHAV CHNG SMOKING 3-10 MIN: CPT | Performed by: INTERNAL MEDICINE

## 2023-01-24 PROCEDURE — G8427 DOCREV CUR MEDS BY ELIG CLIN: HCPCS | Performed by: INTERNAL MEDICINE

## 2023-01-24 PROCEDURE — 4004F PT TOBACCO SCREEN RCVD TLK: CPT | Performed by: INTERNAL MEDICINE

## 2023-01-24 PROCEDURE — 99214 OFFICE O/P EST MOD 30 MIN: CPT | Performed by: INTERNAL MEDICINE

## 2023-01-24 PROCEDURE — G8419 CALC BMI OUT NRM PARAM NOF/U: HCPCS | Performed by: INTERNAL MEDICINE

## 2023-01-24 RX ORDER — NICOTINE 21 MG/24HR
1 PATCH, TRANSDERMAL 24 HOURS TRANSDERMAL EVERY 24 HOURS
Qty: 30 PATCH | Refills: 0 | Status: SHIPPED | OUTPATIENT
Start: 2023-01-24

## 2023-01-24 RX ORDER — ALBUTEROL SULFATE 90 UG/1
AEROSOL, METERED RESPIRATORY (INHALATION)
Qty: 18 G | Refills: 5 | Status: SHIPPED | OUTPATIENT
Start: 2023-01-24

## 2023-01-24 RX ORDER — UMECLIDINIUM BROMIDE AND VILANTEROL TRIFENATATE 62.5; 25 UG/1; UG/1
1 POWDER RESPIRATORY (INHALATION) DAILY
Qty: 1 EACH | Refills: 3 | Status: SHIPPED | OUTPATIENT
Start: 2023-01-24

## 2023-01-24 RX ORDER — ROFLUMILAST 250 UG/1
250 TABLET ORAL DAILY
Qty: 28 TABLET | Refills: 2 | Status: SHIPPED | OUTPATIENT
Start: 2023-01-24

## 2023-01-24 NOTE — PATIENT INSTRUCTIONS

## 2023-01-24 NOTE — PROGRESS NOTES
P  [facility]                                           Pulmonary Clinic Consult     I had the pleasure of seeing  Fuentes Lopez     Chief Complaint   Patient presents with    6 Month Follow-Up     COPD; worsening SOB on exertion. Pt stopped taking inhaler due to side effects       HISTORY OF PRESENT ILLNESS:    Fuentes Lopez is a 58y.o. year old  Who start smoking at age 13 and increasing gradually to 1 pack daily and at x1.5 has about 60 pack.   Smoking history    Patient has diagnosis of severe COPD with FEV1 35 and she has been using Anoro and nebulizer as needed    She also follow with Dr. Rosa Palmer for lung nodule that has been stable    Patient has limited activity as she can walk less than 200 feet at times and gets short winded and less than hips however can do half block with no issues    She has some cough with yellow sputum at times    She has occasional wheezing    She denies any weight weight loss or    Today  visit     She still with SOB and ACKERMAN   She still with ACKERMAN  She got dizzy spells with Trelegy   CT in July shows lung ndoules   Following with PCP for hip pain and she is to call her again today       ALLERGIES:    Allergies   Allergen Reactions    Codeine Nausea Only    Contrast [Iodides]     Vicodin [Hydrocodone-Acetaminophen] Other (See Comments)     GI upset       PAST MEDICAL HISTORY:       Diagnosis Date    Anxiety     Arthritis     COPD (chronic obstructive pulmonary disease) ([facility] [address]     Depression     Hypertension     Kidney stones     Osteoporosis     Thyroid disease        MEDICATIONS:   Current Outpatient Medications   Medication Sig Dispense Refill    albuterol sulfate HFA (PROVENTIL;VENTOLIN;PROAIR) 108 (90 Base) MCG/ACT inhaler INHALE TWO PUFFS BY MOUTH EVERY 6 HOURS AS NEEDED FOR WHEEZING 18 g 5    Umeclidinium-Vilanterol (ANORO ELLIPTA) 62.5-25 MCG/ACT AEPB Inhale 1 each into the lungs daily 60 each 5    TRELEGY ELLIPTA 100-62.5-25 MCG/ACT AEPB inhaler Inhale 1 puff into the lungs in the morning. 1 each 5    albuterol (PROVENTIL) (2.5 MG/3ML) 0.083% nebulizer solution Take 3 mLs by nebulization every 6 hours as needed for Wheezing 60 each 0    nicotine polacrilex (NICORETTE) 2 MG lozenge Take 1 lozenge by mouth as needed for Smoking cessation 100 each 3    ANORO ELLIPTA 62.5-25 MCG/INH AEPB inhaler INHALE ONE DOSE BY MOUTH DAILY 1 each 0    budesonide (ENTOCORT EC) 3 MG extended release capsule TAKE ONE CAPSULE BY MOUTH EVERY MORNING 30 capsule 0    cholestyramine (QUESTRAN) 4 g packet Take 1 packet by mouth 3 times daily 90 packet 3    cyclobenzaprine (FLEXERIL) 10 MG tablet       budesonide (ENTOCORT EC) 3 MG extended release capsule 9 mg daily for 8 weeks, then 6 mg for two weeks, then by 3 mg for another two weeks 210 capsule 0    famotidine (PEPCID) 20 MG tablet Take 1 tablet by mouth 2 times daily 180 tablet 1    colestipol (COLESTID) 1 g tablet Take 2 tablets by mouth 2 times daily 120 tablet 1    meloxicam (MOBIC) 7.5 MG tablet Take 7.5 mg by mouth 2 times daily as needed       levothyroxine (SYNTHROID) 50 MCG tablet       omeprazole (PRILOSEC) 20 MG delayed release capsule Take 20 mg by mouth Daily       FLUoxetine (PROZAC) 20 MG capsule Take 20 mg by mouth daily      Cholecalciferol (VITAMIN D3) 5000 units CAPS       nicotine (NICODERM CQ) 7 MG/24HR Place 1 patch onto the skin daily for 14 days 14 patch 0    NARCAN 4 MG/0.1ML LIQD nasal spray  (Patient not taking: No sig reported)      oxyCODONE-acetaminophen (PERCOCET) 5-325 MG per tablet Take 1 tablet by mouth 3 times daily. (Patient not taking: No sig reported)       No current facility-administered medications for this visit.        SOCIAL AND OCCUPATIONAL HEALTH:  Social History     Tobacco Use   Smoking Status Every Day    Packs/day: 1.00    Years: 40.00    Pack years: 40.00    Types: Cigarettes   Smokeless Tobacco Never   Tobacco Comments    per MD note, pt smoked 1-1.5 ppd for 40 yrs     Industrial exposure: No  Birds :    SURGICAL HISTORY:   Past Surgical History:   Procedure Laterality Date    BREAST LUMPECTOMY Right 2015     SECTION      COLONOSCOPY N/A 2020    COLONOSCOPY WITH BIOPSY performed by Alvaro Lacy MD at [facility] ENDOSCOPY    CYSTOSCOPY  10/23/2019    CYSTOSCOPY, URETHRAL DILATATION     CYSTOSCOPY N/A 10/23/2019    CYSTOSCOPY, URETHRAL DILAT[place] [facility] [place]    OTHER SURGICAL HISTORY      COLONOSCOPY WITH BIOPSY (N/A )    THYROIDECTOMY      UPPER GASTROINTESTINAL ENDOSCOPY  10/12/2020    Dr Lacy did a EGD BIOPSY 10/12/20    UPPER GASTROINTESTINAL ENDOSCOPY N/A 10/12/2020    EGD BIOPSY performed by Alvaro Lacy MD at [facility] ENDOSCOPY       FAMILY HISTORY:   Lung cancer: No  DVT or PE no    REVIEW OF SYSTEMS:  Constitutional: General health is good . There has been no weight changes. No fevers, fatigue or weakness.   Head: Patient denies any history of trauma, convulsive disorder or syncope.    Skin:  Patient denies history of changes in pigmentation, eruptions or pruritus.  No easy bruising or bleeding.  EENT: no nasal congestion   Cardiovascular ,No chest pain ,No edema ,  Respiration:SOB: Yes,ACKERMAN : Yes  Gastrointestinal:No GI bleed ,no melena  ,no hematemesis    Musculoskeletal: no joint pain ,no swelling  Neurological:no , syncope.  Denies twitching, convulsions, loss of consciousness or memory.     Endocrine:  . No history of goiter, exophthalmos or dryness of skin.  The patient has no history of diabetes.    Hematopoietic:  No history of bleeding disorders or easy bruising.  Rheumatic:  No connective tissue disease history or polyarthritis/inflammatory joint disease.      PHYSICAL EXAMINATION:  Vitals:    23 0916   BP: 116/72   Pulse: 74   Resp: 20   SpO2: 94%     Constitutional: This is a well developed, well nourished 62 y.o. year old female who is alert, oriented,  cooperative and in no apparent distress. Head was normocephalic and atraumatic. EENT: Mallampati class :II  Extraocular muscles intact. External canals are patent and no discharge was appreciated. Septum was midline,   mucosa was without erythema, exudates or cobblestoning. No thrush was noted. Neck: Supple without thyromegaly. No elevated JVP. Trachea was midline. No carotid bruits were auscultated. Respiratory: Rhonchi bilaterally/wheezing     Cardiovascular: Regular without murmur, clicks, gallops or rubs. There is no left or right ventricular heave. Pulses:  Carotid, radial and femoral pulses were equally bilaterally. Abdomen: Slightly rounded and soft without organomegaly. No rebound, rigidity or guarding was appreciated. Lymphatic: No lymphadenopathy. Musculoskeletal: rhonchi . Extremities: no edema   Skin:  Warm and dry. Good color, turgor and pigmentation. No lesions or scars. Neurological/Psychiatric: The patient's general behavior, level of consciousness, thought content and emotional status is normal.  Cranial nerves II-XII are intact.       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  3-smoking   4-lung nodule                PLAN:      -did not do well with Trelegy ,will go back anoro  - will give patches nicotine   - with her more than 3-4 exacerbation ,will try Russell;iresp 250   -albuterol nebs as needed  -pulmonary rehab   -I spent 4-6 minutes counseling patient regarding smoking and the risk of Lung cancer and COPD and respiratory failure   _ nicotine patch   Prednisone 20 mg PO X 5 days   Reviewed CT and Right nodule with patient and seems stable will continue yearly screen ,will get one CT July     Flu and Pneumovax as per primary  Ready to quit: Yes  Counseling given: Yes  Tobacco comments: per MD note, pt smoked 1-1.5 ppd for 40 yrs      Thank you for allowing me to participate in [facility]. I will keep following with you ,should you have any concerns ,please contact us at [place] pulmonary office     Sincerely,        Vega Donovan MD  Pulmonary & Critical Care Medicine     NOTE: This report was transcribed using voice recognition software. Every effort was made to ensure accuracy; however, inadvertent computerized transcription errors may be present. Discussed with the patient the current USPSTF guidelines released March 9, 2021 for screening for lung cancer. For adults aged 48 to [de-identified] years who have a 20 pack-year smoking history and currently smoke or have quit within the past 15 years the grade B recommendation is to:  Screen for lung cancer with low-dose computed tomography (LDCT) every year. Stop screening once a person has not smoked for 15 years or has a health problem that limits life expectancy or the ability to have lung surgery. The patient  reports that she has been smoking cigarettes. She has a 40.00 pack-year smoking history. She has never used smokeless tobacco.. Discussed with patient the risks and benefits of screening, including over-diagnosis, false positive rate, and total radiation exposure. The patient currently exhibits no signs or symptoms suggestive of lung cancer. Discussed with patient the importance of compliance with yearly annual lung cancer screenings and willingness to undergo diagnosis and treatment if screening scan is positive. In addition, the patient was counseled regarding the importance of remaining smoke free and/or total smoking cessation.     Also reviewed the following if the patient has Medicare that as of February 10, 2022, Medicare only covers LDCT screening in patients aged 51-72 with at least a 20 pack-year smoking history who currently smoke or have quit in the last 15 years

## 2023-01-24 NOTE — TELEPHONE ENCOUNTER
Pharmacist Chela Waldron from [address] called to let office know that Generic Daliresp requires a P/A and she isn't able to send it to us through [employer]. Can call [phone]. [facility] drug coverage through [address] Chela Waldron DID try brand name and it won't go through either.

## 2023-01-24 NOTE — TELEPHONE ENCOUNTER
Received fax notification from pharmacy stating they've started PA process through [employer]. Padilla: Milana Sierra. PA started online. Determination pending. Will wait for fax determination.

## 2023-02-10 RX ORDER — LEVOTHYROXINE SODIUM 0.05 MG/1
TABLET ORAL
Qty: 30 TABLET | Refills: 0 | OUTPATIENT
Start: 2023-02-10

## 2023-05-30 RX ORDER — ROFLUMILAST 250 UG/1
TABLET ORAL
Qty: 28 TABLET | Refills: 5 | Status: SHIPPED | OUTPATIENT
Start: 2023-05-30

## 2023-06-06 RX ORDER — UMECLIDINIUM BROMIDE AND VILANTEROL TRIFENATATE 62.5; 25 UG/1; UG/1
1 POWDER RESPIRATORY (INHALATION) DAILY
Qty: 60 EACH | Refills: 5 | Status: SHIPPED | OUTPATIENT
Start: 2023-06-06

## 2023-07-21 ENCOUNTER — HOSPITAL ENCOUNTER (OUTPATIENT)
Dept: CT IMAGING | Age: 63
Discharge: HOME | End: 2023-07-21
Payer: MEDICAID

## 2023-07-21 DIAGNOSIS — Z87.891 PERSONAL HISTORY OF TOBACCO USE: ICD-10-CM

## 2023-07-21 PROCEDURE — 71271 CT THORAX LUNG CANCER SCR C-: CPT

## 2023-07-26 ENCOUNTER — TELEPHONE (OUTPATIENT)
Dept: PULMONOLOGY | Age: 63
End: 2023-07-26

## 2023-07-26 NOTE — TELEPHONE ENCOUNTER
Patient did not show for 6 month copd; ct lung screen appointment  with Dr. 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  3-smoking   4-lung nodule                 PLAN:      -did not do well with Trelegy ,will go back anoro  - will give patches nicotine   - with her more than 3-4 exacerbation ,will try Russell;iresp 250   -albuterol nebs as needed  -pulmonary rehab   -I spent 4-6 minutes counseling patient regarding smoking and the risk of Lung cancer and COPD and respiratory failure   _ nicotine patch   Prednisone 20 mg PO X 5 days   Reviewed CT and Right nodule with patient and seems stable will continue yearly screen ,will get one CT July      Flu and Pneumovax as per primary  Ready to quit: Yes  Counseling given: Yes  Tobacco comments: per MD note, pt smoked 1-1.5 ppd for 40 yrs

## 2023-07-27 ENCOUNTER — OFFICE VISIT (OUTPATIENT)
Dept: PULMONOLOGY | Age: 63
End: 2023-07-27
Payer: MEDICAID

## 2023-07-27 VITALS
WEIGHT: 86.4 LBS | HEART RATE: 72 BPM | DIASTOLIC BLOOD PRESSURE: 72 MMHG | BODY MASS INDEX: 13.89 KG/M2 | SYSTOLIC BLOOD PRESSURE: 138 MMHG | RESPIRATION RATE: 16 BRPM | HEIGHT: 66 IN | OXYGEN SATURATION: 96 %

## 2023-07-27 DIAGNOSIS — J44.9 CHRONIC OBSTRUCTIVE PULMONARY DISEASE, UNSPECIFIED COPD TYPE (HCC): Primary | ICD-10-CM

## 2023-07-27 PROCEDURE — G8427 DOCREV CUR MEDS BY ELIG CLIN: HCPCS | Performed by: INTERNAL MEDICINE

## 2023-07-27 PROCEDURE — 3023F SPIROM DOC REV: CPT | Performed by: INTERNAL MEDICINE

## 2023-07-27 PROCEDURE — 99214 OFFICE O/P EST MOD 30 MIN: CPT | Performed by: INTERNAL MEDICINE

## 2023-07-27 PROCEDURE — 3017F COLORECTAL CA SCREEN DOC REV: CPT | Performed by: INTERNAL MEDICINE

## 2023-07-27 PROCEDURE — 4004F PT TOBACCO SCREEN RCVD TLK: CPT | Performed by: INTERNAL MEDICINE

## 2023-07-27 PROCEDURE — G8419 CALC BMI OUT NRM PARAM NOF/U: HCPCS | Performed by: INTERNAL MEDICINE

## 2023-07-27 RX ORDER — ALBUTEROL SULFATE 90 UG/1
AEROSOL, METERED RESPIRATORY (INHALATION)
Qty: 18 G | Refills: 5 | Status: SHIPPED | OUTPATIENT
Start: 2023-07-27

## 2023-07-27 RX ORDER — UMECLIDINIUM BROMIDE AND VILANTEROL TRIFENATATE 62.5; 25 UG/1; UG/1
1 POWDER RESPIRATORY (INHALATION) DAILY
Qty: 60 EACH | Refills: 5 | Status: SHIPPED | OUTPATIENT
Start: 2023-07-27

## 2023-07-27 RX ORDER — ROFLUMILAST 250 UG/1
250 TABLET ORAL DAILY
Qty: 28 TABLET | Refills: 5 | Status: SHIPPED | OUTPATIENT
Start: 2023-07-27

## 2023-07-27 NOTE — PROGRESS NOTES
as per primary  Ready to quit: Not Answered  Counseling given: Not Answered  Tobacco comments: per MD note, pt smoked 1-1.5 ppd for 40 yrs        Thank you for allowing me to participate in [facility]. I will keep following with you ,should you have any concerns ,please contact us at [facility] pulmonary office     Sincerely,        Yoan Dempsey MD  Pulmonary & Critical Care Medicine     NOTE: This report was transcribed using voice recognition software. Every effort was made to ensure accuracy; however, inadvertent computerized transcription errors may be present.     r have quit in the last 15 years

## 2023-09-15 ENCOUNTER — TELEPHONE (OUTPATIENT)
Dept: PULMONOLOGY | Age: 63
End: 2023-09-15

## 2023-09-15 NOTE — TELEPHONE ENCOUNTER
Submitted PA for ROFLUMILAST  Via CM Key: [id number]  STATUS: PENDING. Follow up done daily; if no response in three days we will refax for status check. If another three days goes by with no response we will call the insurance for status.

## 2024-01-31 ENCOUNTER — TELEPHONE (OUTPATIENT)
Dept: PULMONOLOGY | Age: 64
End: 2024-01-31

## 2024-01-31 NOTE — TELEPHONE ENCOUNTER
Patient did not show for 6 month copd appointment  with Dr. 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

## 2024-04-30 NOTE — PROGRESS NOTES
[address] [facility],  [address] [place] [address] Ave  [place], [address] [place] [address]  Phone: [address] She is a Legally  [3] White (non-) [1] 61 y.o. Gabe Stuartgo female        Main Problems/Chief Complaint   The patient has recurrence of diarrhea    HPI      The patient was diagnosed with collagenous colitis last year by Dr. Fran Styles on biopsy of the colon. She was placed on budesonide. She did quite well with it and then budesonide was tapered off around the time she was found to have C. difficile in stool and was treated with vancomycin. Now, the patient has recurrence of diarrhea with 3-4 bowel movements during the day and the need to wake up at night to have a BM. There is no blood in the stool, weight loss or anorexia. No abdominal pain or fever. She does not take omeprazole which is listed as her medication. PAST MEDICAL HISTORY     Past Medical History:   Diagnosis Date    Anxiety     Arthritis     COPD (chronic obstructive pulmonary disease) ([facility] [address]     Depression     Hypertension     Kidney stones     Osteoporosis     Thyroid disease      FAMILY HISTORY   No family history on file.   SOCIAL HISTORY     Social History     Socioeconomic History    Marital status: Legally      Spouse name: Not on file    Number of children: Not on file    Years of education: Not on file    Highest education level: Not on file   Occupational History    Not on file   Tobacco Use    Smoking status: Current Every Day Smoker     Packs/day: 1.00     Years: 40.00     Pack years: 40.00     Types: Cigarettes    Smokeless tobacco: Never Used    Tobacco comment: per MD note, pt smoked 1-1.5 ppd for 40 yrs   Vaping Use    Vaping Use: Never used   Substance and Sexual Activity    Alcohol use: No    Drug use: No    Sexual activity: Not on file   Other Topics Concern    Not on file   Social History Narrative    Not on file     Social Determinants of DENTAL EXTRACTION  DR. BOWLING    PRINTED FOR VB TO REVIEW   Health     Financial Resource Strain:     Difficulty of Paying Living Expenses:    Food Insecurity:     Worried About Running Out of Food in the Last Year:     [address] in the Last Year:    Transportation Needs:     Lack of Transportation (Medical):      Lack of Transportation (Non-Medical):    Physical Activity:     Days of Exercise per Week:     Minutes of Exercise per Session:    Stress:     Feeling of Stress :    Social Connections:     Frequency of Communication with Friends and Family:     Frequency of Social Gatherings with Friends and Family:     Attends Islam Services:     Active Member of Clubs or Organizations:     Attends Club or Organization Meetings:     Marital Status:    Intimate Partner Violence:     Fear of Current or Ex-Partner:     Emotionally Abused:     Physically Abused:     Sexually Abused:      SURGICAL HISTORY     Past Surgical History:   Procedure Laterality Date    BREAST LUMPECTOMY Right 2015     SECTION      COLONOSCOPY N/A 2020    COLONOSCOPY WITH BIOPSY performed by Lesli Peralta MD at [address] [place] [address]  10/23/2019    CYSTOSCOPY, URETHRAL DILATATION     CYSTOSCOPY N/A 10/23/2019    CYSTOSCOPY, URETHRAL DILATATION performed by Ruthy Napoles MD at [address] (N/A )    THYROIDECTOMY      UPPER GASTROINTESTINAL ENDOSCOPY  10/12/2020    Dr Ric Chairez did a EGD BIOPSY 10/12/20    UPPER GASTROINTESTINAL ENDOSCOPY N/A 10/12/2020    EGD BIOPSY performed by Lesli Peralta MD at [address] [address]   (This list may include medications prescribed during this encounter as epic can not insert only the list prior to this encounter.)  Current Outpatient Rx   Medication Sig Dispense Refill    budesonide (ENTOCORT EC) 3 MG extended release capsule Take 1 capsule by mouth every morning 30 capsule 0    cholestyramine (QUESTRAN) 4 g packet Take 1 packet by mouth 3 times daily 90 packet 3    nicotine (NICODERM CQ) 7 MG/24HR Place 1 patch onto the skin daily for 14 days 14 patch 0    ANORO ELLIPTA 62.5-25 MCG/INH AEPB inhaler INHALE ONE DOSE BY MOUTH DAILY 1 each 5    cyclobenzaprine (FLEXERIL) 10 MG tablet       budesonide (ENTOCORT EC) 3 MG extended release capsule 9 mg daily for 8 weeks, then 6 mg for two weeks, then by 3 mg for another two weeks (Patient not taking: Reported on 8/12/2021) 210 capsule 0    famotidine (PEPCID) 20 MG tablet Take 1 tablet by mouth 2 times daily 180 tablet 1    NARCAN 4 MG/0.1ML LIQD nasal spray       colestipol (COLESTID) 1 g tablet Take 2 tablets by mouth 2 times daily 120 tablet 1    meloxicam (MOBIC) 7.5 MG tablet Take 7.5 mg by mouth 2 times daily as needed       levothyroxine (SYNTHROID) 50 MCG tablet       omeprazole (PRILOSEC) 20 MG delayed release capsule Take 20 mg by mouth Daily       albuterol sulfate  (90 Base) MCG/ACT inhaler Inhale 2 puffs into the lungs every 6 hours as needed for Wheezing 1 Inhaler 6    oxyCODONE-acetaminophen (PERCOCET) 5-325 MG per tablet Take 1 tablet by mouth 3 times daily.        FLUoxetine (PROZAC) 20 MG capsule Take 20 mg by mouth daily      Cholecalciferol (VITAMIN D3) 5000 units CAPS       albuterol (PROVENTIL) (2.5 MG/3ML) 0.083% nebulizer solution Take 3 mLs by nebulization every 6 hours as needed for Wheezing 60 vial 0       ALLERGIES     Allergies   Allergen Reactions    Codeine Nausea Only    Contrast [Iodides]     Vicodin [Hydrocodone-Acetaminophen] Other (See Comments)     GI upset       REVIEW OF SYSTEMS   No chest pain suspicious for cardiac origin  No SOB    PHYSICAL EXAM   VITAL SIGNS: /87 (Site: Right Wrist, Position: Sitting)   Pulse 82   Ht 5' 4\" (1.626 m)   Wt 116 lb 12.8 oz (53 kg)   BMI 20.05 kg/m²   Wt Readings from Last 1 Encounters:   08/12/21 116 lb 12.8 oz (53 kg)     Constitutional: Well developed, Well nourished, No acute distress, Non-toxic appearance. Neurologic: Alert & oriented x 3. Psych: Good mood and memory. Pt is able to make appropriate decisions. FINAL IMPRESSION AND RECOMMENDATIONS     Collagenous colitis - recently got tapered off budesonide. Diarrhea has come back - R/O persistent C. Diff. Stool test is ordered. PPI induced diarrhea is eliminated as the patient is no longer taking a PPI. The patient is advised to Hansa a try. If it does not hep, budesonide is ordered. Diagnosis Orders   1. Chronic diarrhea  C DIFF TOXIN/ANTIGEN   2. Collagenous colitis  budesonide (ENTOCORT EC) 3 MG extended release capsule    cholestyramine (QUESTRAN) 4 g packet       The total time spent face-to-face during this visit and before and after the visit was 15 minutes with more than 50% of the time spent in reviewing the electronic chart which showed evidence of collagenous colitis on biopsies, coordination of care for sequential use of Questran and if it does not work, of budesonide at full dose of 9 mg a day and counseling the patient about different management options for treating collagenous colitis and the fact that it is more or less a permanent condition. Sergio Qureshi MD 8/12/21 12:21 PM EDT    CC:  JASON Lara - CNP      IMPORTANT: Please note that some portions of this note may have been created using Dragon voice recognition software. Some \"sound-alike\" and totally wrong word substitutions may have taken place due to known inherent limitations of any such software, including this voice recognition software. In spite of efforts to eliminate such errors, some may not have been corrected. So please read the note with this in mind and recognize such mistakes and understand the correct version using the  context. Thanks.

## 2024-07-03 ENCOUNTER — TELEPHONE (OUTPATIENT)
Dept: TELEMETRY | Age: 64
End: 2024-07-03

## 2024-07-03 NOTE — TELEPHONE ENCOUNTER
Patient due for annual CT Lung Screening. Reminder letter mailed.    Routed to PCP, MIRIAM Ortiz RN

## 2024-08-27 ENCOUNTER — TELEPHONE (OUTPATIENT)
Dept: TELEMETRY | Age: 64
End: 2024-08-27

## 2024-08-27 DIAGNOSIS — J44.9 CHRONIC OBSTRUCTIVE PULMONARY DISEASE, UNSPECIFIED COPD TYPE (HCC): Primary | ICD-10-CM

## 2024-08-27 RX ORDER — ALBUTEROL SULFATE 90 UG/1
AEROSOL, METERED RESPIRATORY (INHALATION)
Qty: 18 EACH | Refills: 5 | Status: SHIPPED | OUTPATIENT
Start: 2024-08-27

## 2024-08-27 RX ORDER — UMECLIDINIUM BROMIDE AND VILANTEROL TRIFENATATE 62.5; 25 UG/1; UG/1
POWDER RESPIRATORY (INHALATION)
Qty: 60 G | Refills: 5 | Status: SHIPPED | OUTPATIENT
Start: 2024-08-27

## 2024-09-27 ENCOUNTER — TELEPHONE (OUTPATIENT)
Dept: TELEMETRY | Age: 64
End: 2024-09-27

## (undated) DEVICE — CANNULA NSL 13FT TUBE AD ETCO2 DIV SAMP M

## (undated) DEVICE — SINGLE-USE BIOPSY FORCEPS: Brand: RADIAL JAW 4

## (undated) DEVICE — CANNULA,OXY,ADULT,SUPERSOFT,W/7'TUB,SC: Brand: MEDLINE INDUSTRIES, INC.

## (undated) DEVICE — ELECTRODE,ECG,STRESS,FOAM,3PK: Brand: MEDLINE

## (undated) DEVICE — ENDO CARRY-ON PROCEDURE KIT INCLUDES SUCTION TUBING, LUBRICANT, GAUZE, BIOHAZARD STICKER, TRANSPORT PAD AND INTERCEPT BEDSIDE KIT.: Brand: ENDO CARRY-ON PROCEDURE KIT

## (undated) DEVICE — BOWL MED L 32OZ PLAS W/ MOLD GRAD EZ OPN PEEL PCH

## (undated) DEVICE — Z DUP USE 2522782 SOLUTION IRRIG 1000ML STRL H2O PLAS CONTAINER UROMATIC

## (undated) DEVICE — CYSTO/BLADDER IRRIGATION SET, REGULATING CLAMP

## (undated) DEVICE — SOLUTION IV IRRIG 500ML 0.9% SODIUM CHL 2F7123

## (undated) DEVICE — GLOVE ORANGE PI 8   MSG9080

## (undated) DEVICE — CONMED SCOPE SAVER BITE BLOCK, 20X27 MM: Brand: SCOPE SAVER

## (undated) DEVICE — DBD-PACK,CYSTOSCOPY,PK VI,AURORA: Brand: MEDLINE

## (undated) DEVICE — GOWN SIRUS NONREIN LG W/TWL: Brand: MEDLINE INDUSTRIES, INC.

## (undated) DEVICE — GAUZE,SPONGE,4"X4",8PLY,STRL,LF,10/TRAY: Brand: MEDLINE

## (undated) DEVICE — BAG URIN STRL FOR URO CTCH SYS

## (undated) DEVICE — PREP SOL PVP IODINE 4%  4 OZ/BTL

## (undated) DEVICE — FORCEPS BX JUMBO L240CM DIA2.8MM WRK CHN 3.2MM ORNG W/ NDL

## (undated) DEVICE — MEDI-VAC NON-CONDUCTIVE SUCTION TUBING: Brand: CARDINAL HEALTH